# Patient Record
Sex: MALE | Race: OTHER | HISPANIC OR LATINO
[De-identification: names, ages, dates, MRNs, and addresses within clinical notes are randomized per-mention and may not be internally consistent; named-entity substitution may affect disease eponyms.]

---

## 2017-01-26 ENCOUNTER — APPOINTMENT (OUTPATIENT)
Dept: ORTHOPEDIC SURGERY | Facility: CLINIC | Age: 37
End: 2017-01-26

## 2017-04-03 ENCOUNTER — APPOINTMENT (OUTPATIENT)
Dept: ORTHOPEDIC SURGERY | Facility: CLINIC | Age: 37
End: 2017-04-03

## 2017-04-27 ENCOUNTER — APPOINTMENT (OUTPATIENT)
Dept: ORTHOPEDIC SURGERY | Facility: CLINIC | Age: 37
End: 2017-04-27

## 2017-10-14 ENCOUNTER — INPATIENT (INPATIENT)
Facility: HOSPITAL | Age: 37
LOS: 2 days | Discharge: HOME | DRG: 379 | End: 2017-10-17
Attending: INTERNAL MEDICINE | Admitting: INTERNAL MEDICINE
Payer: COMMERCIAL

## 2017-10-14 VITALS
DIASTOLIC BLOOD PRESSURE: 74 MMHG | OXYGEN SATURATION: 98 % | RESPIRATION RATE: 18 BRPM | HEART RATE: 81 BPM | SYSTOLIC BLOOD PRESSURE: 106 MMHG | TEMPERATURE: 100 F

## 2017-10-14 DIAGNOSIS — M10.9 GOUT, UNSPECIFIED: ICD-10-CM

## 2017-10-14 DIAGNOSIS — K92.2 GASTROINTESTINAL HEMORRHAGE, UNSPECIFIED: ICD-10-CM

## 2017-10-14 DIAGNOSIS — G47.30 SLEEP APNEA, UNSPECIFIED: ICD-10-CM

## 2017-10-14 LAB
ALBUMIN SERPL ELPH-MCNC: 4.1 G/DL — SIGNIFICANT CHANGE UP (ref 3.3–5)
ALP SERPL-CCNC: 54 U/L — SIGNIFICANT CHANGE UP (ref 40–120)
ALT FLD-CCNC: 29 U/L RC — SIGNIFICANT CHANGE UP (ref 10–45)
ANION GAP SERPL CALC-SCNC: 10 MMOL/L — SIGNIFICANT CHANGE UP (ref 5–17)
APPEARANCE UR: CLEAR — SIGNIFICANT CHANGE UP
APTT BLD: 29.1 SEC — SIGNIFICANT CHANGE UP (ref 27.5–37.4)
AST SERPL-CCNC: 21 U/L — SIGNIFICANT CHANGE UP (ref 10–40)
BASOPHILS # BLD AUTO: 0 K/UL — SIGNIFICANT CHANGE UP (ref 0–0.2)
BASOPHILS NFR BLD AUTO: 0.3 % — SIGNIFICANT CHANGE UP (ref 0–2)
BILIRUB SERPL-MCNC: 0.4 MG/DL — SIGNIFICANT CHANGE UP (ref 0.2–1.2)
BILIRUB UR-MCNC: NEGATIVE — SIGNIFICANT CHANGE UP
BUN SERPL-MCNC: 19 MG/DL — SIGNIFICANT CHANGE UP (ref 7–23)
CALCIUM SERPL-MCNC: 9.4 MG/DL — SIGNIFICANT CHANGE UP (ref 8.4–10.5)
CHLORIDE SERPL-SCNC: 104 MMOL/L — SIGNIFICANT CHANGE UP (ref 96–108)
CO2 SERPL-SCNC: 25 MMOL/L — SIGNIFICANT CHANGE UP (ref 22–31)
COLOR SPEC: SIGNIFICANT CHANGE UP
CREAT SERPL-MCNC: 1.21 MG/DL — SIGNIFICANT CHANGE UP (ref 0.5–1.3)
DIFF PNL FLD: NEGATIVE — SIGNIFICANT CHANGE UP
EOSINOPHIL # BLD AUTO: 0.1 K/UL — SIGNIFICANT CHANGE UP (ref 0–0.5)
EOSINOPHIL NFR BLD AUTO: 1.2 % — SIGNIFICANT CHANGE UP (ref 0–6)
GLUCOSE SERPL-MCNC: 85 MG/DL — SIGNIFICANT CHANGE UP (ref 70–99)
GLUCOSE UR QL: NEGATIVE — SIGNIFICANT CHANGE UP
HCT VFR BLD CALC: 39.7 % — SIGNIFICANT CHANGE UP (ref 39–50)
HCT VFR BLD CALC: 45 % — SIGNIFICANT CHANGE UP (ref 39–50)
HGB BLD-MCNC: 13.8 G/DL — SIGNIFICANT CHANGE UP (ref 13–17)
HGB BLD-MCNC: 15.3 G/DL — SIGNIFICANT CHANGE UP (ref 13–17)
INR BLD: 1.06 RATIO — SIGNIFICANT CHANGE UP (ref 0.88–1.16)
KETONES UR-MCNC: NEGATIVE — SIGNIFICANT CHANGE UP
LEUKOCYTE ESTERASE UR-ACNC: NEGATIVE — SIGNIFICANT CHANGE UP
LIDOCAIN IGE QN: 30 U/L — SIGNIFICANT CHANGE UP (ref 7–60)
LYMPHOCYTES # BLD AUTO: 1.6 K/UL — SIGNIFICANT CHANGE UP (ref 1–3.3)
LYMPHOCYTES # BLD AUTO: 23 % — SIGNIFICANT CHANGE UP (ref 13–44)
MCHC RBC-ENTMCNC: 32.6 PG — SIGNIFICANT CHANGE UP (ref 27–34)
MCHC RBC-ENTMCNC: 33.1 PG — SIGNIFICANT CHANGE UP (ref 27–34)
MCHC RBC-ENTMCNC: 34.1 GM/DL — SIGNIFICANT CHANGE UP (ref 32–36)
MCHC RBC-ENTMCNC: 34.8 GM/DL — SIGNIFICANT CHANGE UP (ref 32–36)
MCV RBC AUTO: 95 FL — SIGNIFICANT CHANGE UP (ref 80–100)
MCV RBC AUTO: 95.5 FL — SIGNIFICANT CHANGE UP (ref 80–100)
MONOCYTES # BLD AUTO: 0.8 K/UL — SIGNIFICANT CHANGE UP (ref 0–0.9)
MONOCYTES NFR BLD AUTO: 11.5 % — SIGNIFICANT CHANGE UP (ref 2–14)
NEUTROPHILS # BLD AUTO: 4.5 K/UL — SIGNIFICANT CHANGE UP (ref 1.8–7.4)
NEUTROPHILS NFR BLD AUTO: 64 % — SIGNIFICANT CHANGE UP (ref 43–77)
NITRITE UR-MCNC: NEGATIVE — SIGNIFICANT CHANGE UP
PH UR: 6 — SIGNIFICANT CHANGE UP (ref 5–8)
PLATELET # BLD AUTO: 194 K/UL — SIGNIFICANT CHANGE UP (ref 150–400)
PLATELET # BLD AUTO: 233 K/UL — SIGNIFICANT CHANGE UP (ref 150–400)
POTASSIUM SERPL-MCNC: 4.5 MMOL/L — SIGNIFICANT CHANGE UP (ref 3.5–5.3)
POTASSIUM SERPL-SCNC: 4.5 MMOL/L — SIGNIFICANT CHANGE UP (ref 3.5–5.3)
PROT SERPL-MCNC: 7.4 G/DL — SIGNIFICANT CHANGE UP (ref 6–8.3)
PROT UR-MCNC: NEGATIVE — SIGNIFICANT CHANGE UP
PROTHROM AB SERPL-ACNC: 11.6 SEC — SIGNIFICANT CHANGE UP (ref 9.8–12.7)
RBC # BLD: 4.18 M/UL — LOW (ref 4.2–5.8)
RBC # BLD: 4.71 M/UL — SIGNIFICANT CHANGE UP (ref 4.2–5.8)
RBC # FLD: 11.6 % — SIGNIFICANT CHANGE UP (ref 10.3–14.5)
RBC # FLD: 11.6 % — SIGNIFICANT CHANGE UP (ref 10.3–14.5)
SODIUM SERPL-SCNC: 139 MMOL/L — SIGNIFICANT CHANGE UP (ref 135–145)
SP GR SPEC: >1.03 — HIGH (ref 1.01–1.02)
UROBILINOGEN FLD QL: NEGATIVE — SIGNIFICANT CHANGE UP
WBC # BLD: 6.4 K/UL — SIGNIFICANT CHANGE UP (ref 3.8–10.5)
WBC # BLD: 7.1 K/UL — SIGNIFICANT CHANGE UP (ref 3.8–10.5)
WBC # FLD AUTO: 6.4 K/UL — SIGNIFICANT CHANGE UP (ref 3.8–10.5)
WBC # FLD AUTO: 7.1 K/UL — SIGNIFICANT CHANGE UP (ref 3.8–10.5)

## 2017-10-14 PROCEDURE — 93010 ELECTROCARDIOGRAM REPORT: CPT

## 2017-10-14 PROCEDURE — 71010: CPT | Mod: 26

## 2017-10-14 PROCEDURE — 74177 CT ABD & PELVIS W/CONTRAST: CPT | Mod: 26

## 2017-10-14 PROCEDURE — 99285 EMERGENCY DEPT VISIT HI MDM: CPT | Mod: 25

## 2017-10-14 RX ORDER — FAMOTIDINE 10 MG/ML
20 INJECTION INTRAVENOUS ONCE
Qty: 0 | Refills: 0 | Status: COMPLETED | OUTPATIENT
Start: 2017-10-14 | End: 2017-10-14

## 2017-10-14 RX ORDER — PANTOPRAZOLE SODIUM 20 MG/1
40 TABLET, DELAYED RELEASE ORAL EVERY 12 HOURS
Qty: 0 | Refills: 0 | Status: DISCONTINUED | OUTPATIENT
Start: 2017-10-14 | End: 2017-10-16

## 2017-10-14 RX ORDER — ONDANSETRON 8 MG/1
4 TABLET, FILM COATED ORAL ONCE
Qty: 0 | Refills: 0 | Status: COMPLETED | OUTPATIENT
Start: 2017-10-14 | End: 2017-10-14

## 2017-10-14 RX ORDER — MODAFINIL 200 MG/1
1 TABLET ORAL
Qty: 0 | Refills: 0 | COMMUNITY

## 2017-10-14 RX ORDER — ACETAMINOPHEN 500 MG
1000 TABLET ORAL ONCE
Qty: 0 | Refills: 0 | Status: COMPLETED | OUTPATIENT
Start: 2017-10-14 | End: 2017-10-14

## 2017-10-14 RX ORDER — SODIUM CHLORIDE 9 MG/ML
3 INJECTION INTRAMUSCULAR; INTRAVENOUS; SUBCUTANEOUS ONCE
Qty: 0 | Refills: 0 | Status: COMPLETED | OUTPATIENT
Start: 2017-10-14 | End: 2017-10-14

## 2017-10-14 RX ORDER — MODAFINIL 200 MG/1
200 TABLET ORAL DAILY
Qty: 0 | Refills: 0 | Status: DISCONTINUED | OUTPATIENT
Start: 2017-10-14 | End: 2017-10-17

## 2017-10-14 RX ORDER — PANTOPRAZOLE SODIUM 20 MG/1
40 TABLET, DELAYED RELEASE ORAL ONCE
Qty: 0 | Refills: 0 | Status: COMPLETED | OUTPATIENT
Start: 2017-10-14 | End: 2017-10-14

## 2017-10-14 RX ORDER — SODIUM CHLORIDE 9 MG/ML
1000 INJECTION, SOLUTION INTRAVENOUS
Qty: 0 | Refills: 0 | Status: DISCONTINUED | OUTPATIENT
Start: 2017-10-14 | End: 2017-10-17

## 2017-10-14 RX ORDER — OMEGA-3 ACID ETHYL ESTERS 1 G
1 CAPSULE ORAL
Qty: 0 | Refills: 0 | COMMUNITY

## 2017-10-14 RX ORDER — INFLUENZA VIRUS VACCINE 15; 15; 15; 15 UG/.5ML; UG/.5ML; UG/.5ML; UG/.5ML
0.5 SUSPENSION INTRAMUSCULAR ONCE
Qty: 0 | Refills: 0 | Status: COMPLETED | OUTPATIENT
Start: 2017-10-14 | End: 2017-10-14

## 2017-10-14 RX ORDER — SODIUM CHLORIDE 9 MG/ML
1000 INJECTION INTRAMUSCULAR; INTRAVENOUS; SUBCUTANEOUS ONCE
Qty: 0 | Refills: 0 | Status: COMPLETED | OUTPATIENT
Start: 2017-10-14 | End: 2017-10-14

## 2017-10-14 RX ORDER — GLYCERIN, PHENYLEPHRINE HCL, PRAMOXINE HCL, WHITE PETROLATUM 14.4; .25; 1; 15 G/100G; G/100G; G/100G; G/100G
0 CREAM TOPICAL
Qty: 0 | Refills: 0 | COMMUNITY

## 2017-10-14 RX ADMIN — PANTOPRAZOLE SODIUM 40 MILLIGRAM(S): 20 TABLET, DELAYED RELEASE ORAL at 15:54

## 2017-10-14 RX ADMIN — SODIUM CHLORIDE 3 MILLILITER(S): 9 INJECTION INTRAMUSCULAR; INTRAVENOUS; SUBCUTANEOUS at 15:54

## 2017-10-14 RX ADMIN — FAMOTIDINE 20 MILLIGRAM(S): 10 INJECTION INTRAVENOUS at 15:53

## 2017-10-14 RX ADMIN — ONDANSETRON 4 MILLIGRAM(S): 8 TABLET, FILM COATED ORAL at 15:53

## 2017-10-14 RX ADMIN — Medication 400 MILLIGRAM(S): at 21:00

## 2017-10-14 RX ADMIN — SODIUM CHLORIDE 1000 MILLILITER(S): 9 INJECTION INTRAMUSCULAR; INTRAVENOUS; SUBCUTANEOUS at 15:53

## 2017-10-14 NOTE — ED PROVIDER NOTE - OBJECTIVE STATEMENT
Private Physician Mckenzie Freeman, pcp,shawn Manzo ([id number]  37y male pmh rectal bleeding with dark tarry stool with  clots, past two weeks, Last night symptoms worsened. Pt complains of fatigued, weak, Nasuea without vomiting. Last colonscopy 7y ago ? fissure. No chest pain,sob,palps.hemetemeis. Private Physician Mckenzie Freeman, pcp,   Modesto Manzo (GI) not seen recently.  37y male pmh rectal bleeding with dark tarry stool with  clots, past two weeks, Last night symptoms worsened. Pt complains of fatigued, weak, Nasuea without vomiting. Last colonscopy 7y ago ? fissure. No chest pain,sob,palps.hemetemeis.

## 2017-10-14 NOTE — H&P ADULT - HISTORY OF PRESENT ILLNESS
·37y male pmh rectal bleeding with dark tarry stool with clots, past two weeks, Last night symptoms worsened. Pt complains of fatigued, weak, Nausea without vomiting. Last coloscopy 7y ago ? fissure. No chest pain,sob, fever, chills,  palpitations. hematemesis, no recent weight loss, loss of appetite pt says that bleeding per rectum was bloody few day ago and since yesterday it was DARK BLACK colored . denies using nsaids

## 2017-10-14 NOTE — ED PROVIDER NOTE - MEDICAL DECISION MAKING DETAILS
PT with prior gi bleed pw bleeding with clots/melena, Check labs, ct abd ekg, probable admit.  Jason Henry MD, Facep

## 2017-10-14 NOTE — ED ADULT NURSE NOTE - OBJECTIVE STATEMENT
36 y/o male hx HTN, Vertigo, sleep apnea, presents to the ED c/o black tarry stools x 2 weeks. As per pt, had past hx of similar episode, dx with internal hemorrhoids. Pt endorsing, LLQ abdominal pain, multiple episodes of black tarry stool diarrhea +clots, chills, nausea. Denies fever, vomiting, urinary s/s. Pt A&Ox3, in no respiratory distress, no CP, abdomen soft, tender to palpation in LLQ, nondistended, +BS, resting comfortably with safety maintained and family at bedside.

## 2017-10-14 NOTE — ED ADULT NURSE REASSESSMENT NOTE - NS ED NURSE REASSESS COMMENT FT1
Pt resting comfortably states improvement of pain, awaiting CT results and bed. Resting comfortably with safety maintained.

## 2017-10-15 LAB
ANION GAP SERPL CALC-SCNC: 13 MMOL/L — SIGNIFICANT CHANGE UP (ref 5–17)
BUN SERPL-MCNC: 14 MG/DL — SIGNIFICANT CHANGE UP (ref 7–23)
CALCIUM SERPL-MCNC: 8.8 MG/DL — SIGNIFICANT CHANGE UP (ref 8.4–10.5)
CHLORIDE SERPL-SCNC: 106 MMOL/L — SIGNIFICANT CHANGE UP (ref 96–108)
CO2 SERPL-SCNC: 22 MMOL/L — SIGNIFICANT CHANGE UP (ref 22–31)
CREAT SERPL-MCNC: 1.2 MG/DL — SIGNIFICANT CHANGE UP (ref 0.5–1.3)
GLUCOSE SERPL-MCNC: 90 MG/DL — SIGNIFICANT CHANGE UP (ref 70–99)
HCT VFR BLD CALC: 42 % — SIGNIFICANT CHANGE UP (ref 39–50)
HGB BLD-MCNC: 13.6 G/DL — SIGNIFICANT CHANGE UP (ref 13–17)
MCHC RBC-ENTMCNC: 30 PG — SIGNIFICANT CHANGE UP (ref 27–34)
MCHC RBC-ENTMCNC: 32.4 GM/DL — SIGNIFICANT CHANGE UP (ref 32–36)
MCV RBC AUTO: 92.7 FL — SIGNIFICANT CHANGE UP (ref 80–100)
PLATELET # BLD AUTO: 223 K/UL — SIGNIFICANT CHANGE UP (ref 150–400)
POTASSIUM SERPL-MCNC: 4.5 MMOL/L — SIGNIFICANT CHANGE UP (ref 3.5–5.3)
POTASSIUM SERPL-SCNC: 4.5 MMOL/L — SIGNIFICANT CHANGE UP (ref 3.5–5.3)
RBC # BLD: 4.53 M/UL — SIGNIFICANT CHANGE UP (ref 4.2–5.8)
RBC # FLD: 12.9 % — SIGNIFICANT CHANGE UP (ref 10.3–14.5)
SODIUM SERPL-SCNC: 141 MMOL/L — SIGNIFICANT CHANGE UP (ref 135–145)
WBC # BLD: 5.47 K/UL — SIGNIFICANT CHANGE UP (ref 3.8–10.5)
WBC # FLD AUTO: 5.47 K/UL — SIGNIFICANT CHANGE UP (ref 3.8–10.5)

## 2017-10-15 RX ORDER — SOD SULF/SODIUM/NAHCO3/KCL/PEG
1 SOLUTION, RECONSTITUTED, ORAL ORAL
Qty: 0 | Refills: 0 | Status: DISCONTINUED | OUTPATIENT
Start: 2017-10-15 | End: 2017-10-17

## 2017-10-15 RX ORDER — MULTIVIT WITH MIN/MFOLATE/K2 340-15/3 G
295 POWDER (GRAM) ORAL ONCE
Qty: 0 | Refills: 0 | Status: COMPLETED | OUTPATIENT
Start: 2017-10-15 | End: 2017-10-15

## 2017-10-15 RX ORDER — ACETAMINOPHEN 500 MG
650 TABLET ORAL ONCE
Qty: 0 | Refills: 0 | Status: COMPLETED | OUTPATIENT
Start: 2017-10-15 | End: 2017-10-15

## 2017-10-15 RX ADMIN — PANTOPRAZOLE SODIUM 40 MILLIGRAM(S): 20 TABLET, DELAYED RELEASE ORAL at 17:33

## 2017-10-15 RX ADMIN — MODAFINIL 200 MILLIGRAM(S): 200 TABLET ORAL at 11:24

## 2017-10-15 RX ADMIN — Medication 650 MILLIGRAM(S): at 22:24

## 2017-10-15 RX ADMIN — PANTOPRAZOLE SODIUM 40 MILLIGRAM(S): 20 TABLET, DELAYED RELEASE ORAL at 06:26

## 2017-10-15 RX ADMIN — Medication 1 LITER(S): at 18:28

## 2017-10-15 RX ADMIN — Medication 295 MILLILITER(S): at 13:41

## 2017-10-15 RX ADMIN — Medication 650 MILLIGRAM(S): at 04:08

## 2017-10-15 RX ADMIN — SODIUM CHLORIDE 75 MILLILITER(S): 9 INJECTION, SOLUTION INTRAVENOUS at 11:13

## 2017-10-15 RX ADMIN — Medication 650 MILLIGRAM(S): at 21:54

## 2017-10-15 RX ADMIN — Medication 650 MILLIGRAM(S): at 04:38

## 2017-10-15 NOTE — PROGRESS NOTE ADULT - SUBJECTIVE AND OBJECTIVE BOX
~Patient doing well  No significant bleeding  AVSS  Abd benign  H/H stable  Plan for scope by GI on Monday  Will Follow

## 2017-10-15 NOTE — PROGRESS NOTE ADULT - SUBJECTIVE AND OBJECTIVE BOX
chief complaint : bleeding per rectum         SUBJECTIVE / OVERNIGHT EVENTS: pt denies abd pain, nausea, vomiting       MEDICATIONS  (STANDING):  influenza   Vaccine 0.5 milliLiter(s) IntraMuscular once  modafinil 200 milliGRAM(s) Oral daily  pantoprazole  Injectable 40 milliGRAM(s) IV Push every 12 hours  polyethylene glycol/electrolyte Solution 1 Liter(s) Oral two times a day  sodium chloride 0.45%. 1000 milliLiter(s) (75 mL/Hr) IV Continuous <Continuous>    MEDICATIONS  (PRN):        CAPILLARY BLOOD GLUCOSE        I&O's Summary    14 Oct 2017 07:01  -  15 Oct 2017 07:00  --------------------------------------------------------  IN: 200 mL / OUT: 0 mL / NET: 200 mL    15 Oct 2017 07:01  -  15 Oct 2017 22:51  --------------------------------------------------------  IN: 1100 mL / OUT: 0 mL / NET: 1100 mL        Constitutional: No fever, fatigue  Skin: No rash.  Eyes: No recent vision problems or eye pain.  ENT: No congestion, ear pain, or sore throat.  Cardiovascular: No chest pain or palpation.  Respiratory: No cough, shortness of breath, congestion, or wheezing.  Gastrointestinal: No abdominal pain, nausea, vomiting, or diarrhea.  Genitourinary: No dysuria.  Musculoskeletal: No joint swelling.  Neurologic: No headache.    PHYSICAL EXAM:  GENERAL: NAD  EYES: EOMI, PERRLA  NECK: Supple, No JVD  CHEST/LUNG: dec breath sounds at bases   HEART:  S1 , S2 +  ABDOMEN: obese soft, bs+  EXTREMITIES:  no edema  NEUROLOGY: alert awake calm cooperative       LABS:                        13.6   5.47  )-----------( 223      ( 15 Oct 2017 09:07 )             42.0     10-15    141  |  106  |  14  ----------------------------<  90  4.5   |  22  |  1.20    Ca    8.8      15 Oct 2017 09:30    TPro  7.4  /  Alb  4.1  /  TBili  0.4  /  DBili  x   /  AST  21  /  ALT  29  /  AlkPhos  54  10-14    PT/INR - ( 14 Oct 2017 16:21 )   PT: 11.6 sec;   INR: 1.06 ratio         PTT - ( 14 Oct 2017 16:21 )  PTT:29.1 sec      Urinalysis Basic - ( 14 Oct 2017 18:50 )    Color: PL Yellow / Appearance: Clear / SG: >1.030 / pH: x  Gluc: x / Ketone: Negative  / Bili: Negative / Urobili: Negative   Blood: x / Protein: Negative / Nitrite: Negative   Leuk Esterase: Negative / RBC: x / WBC x   Sq Epi: x / Non Sq Epi: x / Bacteria: x        RADIOLOGY & ADDITIONAL TESTS:    Imaging Personally Reviewed:    Consultant(s) Notes Reviewed:      Care Discussed with Consultants/Other Providers:

## 2017-10-15 NOTE — CONSULT NOTE ADULT - SUBJECTIVE AND OBJECTIVE BOX
Patient is a 37y Male     Patient is a 37y old  Male who presents with a chief complaint of GI bleed (14 Oct 2017 23:36)      HPI:  ·37y male pmh rectal bleeding with dark tarry stool with clots, past two weeks, Last night symptoms worsened. Pt complains of fatigued, weak, Nausea without vomiting. Last coloscopy 7y ago ? fissure. No chest pain,sob, fever, chills,  palpitations. hematemesis, no recent weight loss, loss of appetite pt says that bleeding per rectum was bloody few day ago and since yesterday it was DARK BLACK colored . denies using nsaids (14 Oct 2017 18:50) He has been taking supplements containing iron, denied pepto and uses NSAIDs occasionally. Had colonoscopy 8 yrs ago for bleeding and found to have anal fissure. Has difficulty with constipation and dx with IBS in the past.  He has gained a lot of weight recently.       PAST MEDICAL & SURGICAL HISTORY:  Vertigo  Hypertension  Gout  Sleep apnea-severe per pt.   Migraine  No significant past surgical history  No significant past surgical history      MEDICATIONS  (STANDING):  influenza   Vaccine 0.5 milliLiter(s) IntraMuscular once  modafinil 200 milliGRAM(s) Oral daily  pantoprazole  Injectable 40 milliGRAM(s) IV Push every 12 hours  sodium chloride 0.45%. 1000 milliLiter(s) (75 mL/Hr) IV Continuous <Continuous>      Allergies    No Known Allergies    Intolerances        SOCIAL HISTORY:  Denies ETOh,Smoking,     FAMILY HISTORY:      REVIEW OF SYSTEMS:    CONSTITUTIONAL: No weakness, fevers or chills  EYES/ENT: No visual changes;  No vertigo or throat pain   NECK: No pain or stiffness  RESPIRATORY: No cough, wheezing, hemoptysis; No shortness of breath  CARDIOVASCULAR: No chest pain or palpitations  GASTROINTESTINAL: No abdominal or epigastric pain. No nausea, vomiting, or hematemesis; No diarrhea or constipation. No melena or hematochezia.  GENITOURINARY: No dysuria, frequency or hematuria  NEUROLOGICAL: No numbness or weakness  SKIN: No itching, burning, rashes, or lesions   All other review of systems is negative unless indicated above.    VITAL:  T(C): , Max: 37.6 (10-14-17 @ 14:12)  T(F): , Max: 99.6 (10-14-17 @ 14:12)  HR: 63 (10-15-17 @ 03:51)  BP: 112/74 (10-15-17 @ 03:51)  BP(mean): --  RR: 17 (10-15-17 @ 03:51)  SpO2: 99% (10-15-17 @ 03:51)  Wt(kg): --    I and O's:    10-14 @ 07:01  -  10-15 @ 07:00  --------------------------------------------------------  IN: 200 mL / OUT: 0 mL / NET: 200 mL      Height (cm): 175.26 (10-14 @ 22:45)  Weight (kg): 128.4 (10-14 @ 22:45)  BMI (kg/m2): 41.8 (10-14 @ 22:45)  BSA (m2): 2.39 (10-14 @ 22:45)    PHYSICAL EXAM:    Constitutional: NAD  HEENT: PERRLA,   Neck: No JVD  Respiratory: CTA B/L  Cardiovascular: S1 and S2  Gastrointestinal: BS+, soft, NT/ND  Extremities: No peripheral edema  Neurological: A/O x 3, no focal deficits  Psychiatric: Normal mood, normal affect  : No Pina  Skin: No rashes  Access: Not applicable  Back: No CVA tenderness    LABS:                        13.6   5.47  )-----------( 223      ( 15 Oct 2017 09:07 )             42.0     10-15    141  |  106  |  14  ----------------------------<  90  4.5   |  22  |  1.20    Ca    8.8      15 Oct 2017 09:30    TPro  7.4  /  Alb  4.1  /  TBili  0.4  /  DBili  x   /  AST  21  /  ALT  29  /  AlkPhos  54  10-14          RADIOLOGY & ADDITIONAL STUDIES:

## 2017-10-15 NOTE — CONSULT NOTE ADULT - ASSESSMENT
History suggests significant GI bleeding over the past 2 weeks but Hb normal. Iron supplements contributing to dark stools. Recommended EGD and colonoscopy as inpt due to his severe LYNN. R/B/A discussed. Will prep today for procedures tomorrow.

## 2017-10-16 ENCOUNTER — TRANSCRIPTION ENCOUNTER (OUTPATIENT)
Age: 37
End: 2017-10-16

## 2017-10-16 ENCOUNTER — RESULT REVIEW (OUTPATIENT)
Age: 37
End: 2017-10-16

## 2017-10-16 LAB
HCT VFR BLD CALC: 41 % — SIGNIFICANT CHANGE UP (ref 39–50)
HGB BLD-MCNC: 13.7 G/DL — SIGNIFICANT CHANGE UP (ref 13–17)
MCHC RBC-ENTMCNC: 30.8 PG — SIGNIFICANT CHANGE UP (ref 27–34)
MCHC RBC-ENTMCNC: 33.4 GM/DL — SIGNIFICANT CHANGE UP (ref 32–36)
MCV RBC AUTO: 92.1 FL — SIGNIFICANT CHANGE UP (ref 80–100)
PLATELET # BLD AUTO: 228 K/UL — SIGNIFICANT CHANGE UP (ref 150–400)
RBC # BLD: 4.45 M/UL — SIGNIFICANT CHANGE UP (ref 4.2–5.8)
RBC # FLD: 13 % — SIGNIFICANT CHANGE UP (ref 10.3–14.5)
WBC # BLD: 6.36 K/UL — SIGNIFICANT CHANGE UP (ref 3.8–10.5)
WBC # FLD AUTO: 6.36 K/UL — SIGNIFICANT CHANGE UP (ref 3.8–10.5)

## 2017-10-16 PROCEDURE — 88312 SPECIAL STAINS GROUP 1: CPT | Mod: 26

## 2017-10-16 PROCEDURE — 88305 TISSUE EXAM BY PATHOLOGIST: CPT | Mod: 26

## 2017-10-16 RX ORDER — PANTOPRAZOLE SODIUM 20 MG/1
40 TABLET, DELAYED RELEASE ORAL
Qty: 0 | Refills: 0 | Status: DISCONTINUED | OUTPATIENT
Start: 2017-10-16 | End: 2017-10-17

## 2017-10-16 RX ADMIN — MODAFINIL 200 MILLIGRAM(S): 200 TABLET ORAL at 09:00

## 2017-10-16 RX ADMIN — PANTOPRAZOLE SODIUM 40 MILLIGRAM(S): 20 TABLET, DELAYED RELEASE ORAL at 05:50

## 2017-10-16 RX ADMIN — Medication 1 LITER(S): at 05:49

## 2017-10-16 RX ADMIN — PANTOPRAZOLE SODIUM 40 MILLIGRAM(S): 20 TABLET, DELAYED RELEASE ORAL at 18:44

## 2017-10-16 NOTE — PROGRESS NOTE ADULT - SUBJECTIVE AND OBJECTIVE BOX
LEVI LOPEZ:[id number],   37yMale followed for: brbpr  No Known Allergies    PAST MEDICAL & SURGICAL HISTORY:  Vertigo  Hypertension  Gout  Sleep apnea  Migraine  No significant past surgical history  No significant past surgical history    FAMILY HISTORY:    MEDICATIONS  (STANDING):  influenza   Vaccine 0.5 milliLiter(s) IntraMuscular once  modafinil 200 milliGRAM(s) Oral daily  pantoprazole    Tablet 40 milliGRAM(s) Oral two times a day before meals  polyethylene glycol/electrolyte Solution 1 Liter(s) Oral two times a day  sodium chloride 0.45%. 1000 milliLiter(s) (75 mL/Hr) IV Continuous <Continuous>    MEDICATIONS  (PRN):      Vital Signs Last 24 Hrs  T(C): 36.9 (16 Oct 2017 12:23), Max: 36.9 (16 Oct 2017 12:23)  T(F): 98.4 (16 Oct 2017 12:23), Max: 98.4 (16 Oct 2017 12:23)  HR: 59 (16 Oct 2017 12:23) (59 - 72)  BP: 109/63 (16 Oct 2017 12:23) (109/63 - 124/84)  BP(mean): --  RR: 18 (16 Oct 2017 12:23) (18 - 18)  SpO2: 97% (16 Oct 2017 12:23) (97% - 98%)  nc/at  s1s2  cta  soft, nt, nd no guarding or rebound  no c/c/e    CBC Full  -  ( 16 Oct 2017 07:22 )  WBC Count : 6.36 K/uL  Hemoglobin : 13.7 g/dL  Hematocrit : 41.0 %  Platelet Count - Automated : 228 K/uL  Mean Cell Volume : 92.1 fl  Mean Cell Hemoglobin : 30.8 pg  Mean Cell Hemoglobin Concentration : 33.4 gm/dL  Auto Neutrophil # : x  Auto Lymphocyte # : x  Auto Monocyte # : x  Auto Eosinophil # : x  Auto Basophil # : x  Auto Neutrophil % : x  Auto Lymphocyte % : x  Auto Monocyte % : x  Auto Eosinophil % : x  Auto Basophil % : x    10-15    141  |  106  |  14  ----------------------------<  90  4.5   |  22  |  1.20    Ca    8.8      15 Oct 2017 09:30    TPro  7.4  /  Alb  4.1  /  TBili  0.4  /  DBili  x   /  AST  21  /  ALT  29  /  AlkPhos  54  10-14    PT/INR - ( 14 Oct 2017 16:21 )   PT: 11.6 sec;   INR: 1.06 ratio         PTT - ( 14 Oct 2017 16:21 )  PTT:29.1 sec

## 2017-10-16 NOTE — DISCHARGE NOTE ADULT - MEDICATION SUMMARY - MEDICATIONS TO TAKE
I will START or STAY ON the medications listed below when I get home from the hospital:    Preparation H Cream  -- As needed  -- Indication: For hemorrhoids     Provigil 200 mg oral tablet  -- 1 tab(s) by mouth once a day  -- Indication: For juanpablo    Fish Oil oral capsule  -- 1 cap(s) by mouth once a day  -- Indication: For Supplement    pantoprazole 40 mg oral delayed release tablet  -- 1 tab(s) by mouth 2 times a day (before meals)  -- Indication: For Gastrointestinal hemorrhage    Multiple Vitamins oral tablet  -- 1 tab(s) by mouth once a day  -- Indication: For Supplement

## 2017-10-16 NOTE — DISCHARGE NOTE ADULT - HOSPITAL COURSE
37y male pmh rectal bleeding with dark tarry stool with clots, past two weeks, Last night symptoms worsened. Pt complains of fatigued, weak, Nausea without vomiting. Last coloscopy 7y ago ? fissure. No chest pain,sob, fever, chills,  palpitations. Hematemesis, no recent weight loss, loss of appetite pt says that bleeding per rectum was bloody few day ago and since yesterday it was DARK BLACK colored . Denies using nsaids   - Normal esophagus.                       - Normal stomach. mild gastritis, bx                       - Normal examined duodenum.                       - No specimens collected.   A few small-mouthed diverticula were found in the sigmoid colon.  Pt to follow up with Dr. Shaw as outpatient for capsule study.

## 2017-10-16 NOTE — DISCHARGE NOTE ADULT - PLAN OF CARE
resolved There are 2 common types of GI Bleed, Upper GI Bleed and Lower GI Bleed.  Upper GI Bleed affects the esophagus, stomach, and first part of the small intestine. Lower GI Bleed affects the colon and rectum.  Upper GI Bleed signs and symptoms to notify your Health Care Provider are vomiting blood, or coffee ground vomitus, and bowel movements that look like black tar.  Lower GI Bleed signs and symptoms to notify your health care provider are bright red bloody bowel movements.   Take your medications as prescribed by your Gastroenterologist.  If you have had an Endoscopy or Colonoscopy, follow up with your Gastroenterologist for Pathology results.  Avoid NSAIDs unless your Health Care Provider tells you that it is ok (Aspirin, Ibuprofen, Advil, Motrin, Aleve).  Follow up with your Gastroenterologist within 1-2 weeks of discharge. follow up with PMD Follow up with your medical doctor to establish long term blood pressure treatment goals. continue provigil  follow up with PMD

## 2017-10-16 NOTE — DISCHARGE NOTE ADULT - CARE PLAN
Principal Discharge DX:	GI bleed  Goal:	resolved  Instructions for follow-up, activity and diet:	There are 2 common types of GI Bleed, Upper GI Bleed and Lower GI Bleed.  Upper GI Bleed affects the esophagus, stomach, and first part of the small intestine. Lower GI Bleed affects the colon and rectum.  Upper GI Bleed signs and symptoms to notify your Health Care Provider are vomiting blood, or coffee ground vomitus, and bowel movements that look like black tar.  Lower GI Bleed signs and symptoms to notify your health care provider are bright red bloody bowel movements.   Take your medications as prescribed by your Gastroenterologist.  If you have had an Endoscopy or Colonoscopy, follow up with your Gastroenterologist for Pathology results.  Avoid NSAIDs unless your Health Care Provider tells you that it is ok (Aspirin, Ibuprofen, Advil, Motrin, Aleve).  Follow up with your Gastroenterologist within 1-2 weeks of discharge.  Secondary Diagnosis:	Gout  Instructions for follow-up, activity and diet:	follow up with PMD  Secondary Diagnosis:	Hypertension  Instructions for follow-up, activity and diet:	Follow up with your medical doctor to establish long term blood pressure treatment goals.  Secondary Diagnosis:	Sleep apnea  Instructions for follow-up, activity and diet:	continue provigil  follow up with PMD

## 2017-10-16 NOTE — DISCHARGE NOTE ADULT - PATIENT PORTAL LINK FT
“You can access the [employer] Patient Portal, offered by [facility], by registering with the following website: [email or web address]”

## 2017-10-16 NOTE — PROGRESS NOTE ADULT - SUBJECTIVE AND OBJECTIVE BOX
chief complaint : bleeding per rectum         SUBJECTIVE / OVERNIGHT EVENTS: pt denies abd pain, nausea, vomiting     MEDICATIONS  (STANDING):  influenza   Vaccine 0.5 milliLiter(s) IntraMuscular once  modafinil 200 milliGRAM(s) Oral daily  pantoprazole    Tablet 40 milliGRAM(s) Oral two times a day before meals  polyethylene glycol/electrolyte Solution 1 Liter(s) Oral two times a day  sodium chloride 0.45%. 1000 milliLiter(s) (75 mL/Hr) IV Continuous <Continuous>    MEDICATIONS  (PRN):      Vital Signs Last 24 Hrs  T(C): 36.8 (16 Oct 2017 04:02), Max: 36.8 (15 Oct 2017 12:43)  T(F): 98.3 (16 Oct 2017 04:02), Max: 98.3 (16 Oct 2017 04:02)  HR: 72 (16 Oct 2017 04:02) (68 - 72)  BP: 124/84 (16 Oct 2017 04:02) (101/75 - 124/84)  BP(mean): --  RR: 18 (16 Oct 2017 04:02) (18 - 18)  SpO2: 97% (16 Oct 2017 04:02) (97% - 98%)    Constitutional: No fever, fatigue  Skin: No rash.  Eyes: No recent vision problems or eye pain.  ENT: No congestion, ear pain, or sore throat.  Cardiovascular: No chest pain or palpation.  Respiratory: No cough, shortness of breath, congestion, or wheezing.  Gastrointestinal: No abdominal pain, nausea, vomiting, or diarrhea.  Genitourinary: No dysuria.  Musculoskeletal: No joint swelling.  Neurologic: No headache.    PHYSICAL EXAM:  GENERAL: NAD  EYES: EOMI, PERRLA  NECK: Supple, No JVD  CHEST/LUNG: dec breath sounds at bases   HEART:  S1 , S2 +  ABDOMEN: obese soft, bs+  EXTREMITIES:  no edema  NEUROLOGY: alert awake calm cooperative     LABS:  10-15    141  |  106  |  14  ----------------------------<  90  4.5   |  22  |  1.20    Ca    8.8      15 Oct 2017 09:30    TPro  7.4  /  Alb  4.1  /  TBili  0.4  /  DBili      /  AST  21  /  ALT  29  /  AlkPhos  54  10-14    Creatinine Trend: 1.20 <--, 1.21 <--                        13.7   6.36  )-----------( 228      ( 16 Oct 2017 07:22 )             41.0     Urine Studies:  Urinalysis Basic - ( 14 Oct 2017 18:50 )    Color: PL Yellow / Appearance: Clear / SG: >1.030 / pH:   Gluc:  / Ketone: Negative  / Bili: Negative / Urobili: Negative   Blood:  / Protein: Negative / Nitrite: Negative   Leuk Esterase: Negative / RBC:  / WBC    Sq Epi:  / Non Sq Epi:  / Bacteria:               LIVER FUNCTIONS - ( 14 Oct 2017 16:21 )  Alb: 4.1 g/dL / Pro: 7.4 g/dL / ALK PHOS: 54 U/L / ALT: 29 U/L RC / AST: 21 U/L / GGT: x           PT/INR - ( 14 Oct 2017 16:21 )   PT: 11.6 sec;   INR: 1.06 ratio         PTT - ( 14 Oct 2017 16:21 )  PTT:29.1 sec

## 2017-10-16 NOTE — DISCHARGE NOTE ADULT - CARE PROVIDER_API CALL
Claude Shaw), Gastroenterology  [address]  [place] [address]  Phone: [phone]  Fax: [phone]

## 2017-10-16 NOTE — CONSULT NOTE ADULT - SUBJECTIVE AND OBJECTIVE BOX
Patient is a 37y Male     Patient is a 37y old  Male who presents with a chief complaint of GI bleed (14 Oct 2017 23:36)      HPI:  ·37y male pmh rectal bleeding with dark tarry stool with clots, past two weeks, Last night symptoms worsened. Pt complains of fatigued, weak, Nausea without vomiting. Last coloscopy 7y ago ? fissure. No chest pain,sob, fever, chills,  palpitations. hematemesis, no recent weight loss, loss of appetite pt says that bleeding per rectum was bloody few day ago and since yesterday it was DARK BLACK colored . denies using nsaids (14 Oct 2017 18:50) He has been taking supplements containing iron, denied pepto and uses NSAIDs occasionally. Had colonoscopy 8 yrs ago for bleeding and found to have anal fissure. Has difficulty with constipation and dx with IBS in the past.  He has gained a lot of weight recently.       PAST MEDICAL & SURGICAL HISTORY:  Vertigo  Hypertension  Gout  Sleep apnea-severe per pt.   Migraine  No significant past surgical history  No significant past surgical history      MEDICATIONS  (STANDING):  influenza   Vaccine 0.5 milliLiter(s) IntraMuscular once  modafinil 200 milliGRAM(s) Oral daily  pantoprazole  Injectable 40 milliGRAM(s) IV Push every 12 hours  sodium chloride 0.45%. 1000 milliLiter(s) (75 mL/Hr) IV Continuous <Continuous>      Allergies    No Known Allergies    Intolerances        SOCIAL HISTORY:  Denies ETOh,Smoking,     FAMILY HISTORY:      REVIEW OF SYSTEMS:    CONSTITUTIONAL: No weakness, fevers or chills  EYES/ENT: No visual changes;  No vertigo or throat pain   NECK: No pain or stiffness  RESPIRATORY: No cough, wheezing, hemoptysis; No shortness of breath  CARDIOVASCULAR: No chest pain or palpitations  GASTROINTESTINAL: No abdominal or epigastric pain. No nausea, vomiting, or hematemesis; No diarrhea or constipation. No melena or hematochezia.  GENITOURINARY: No dysuria, frequency or hematuria  NEUROLOGICAL: No numbness or weakness  SKIN: No itching, burning, rashes, or lesions   All other review of systems is negative unless indicated above.    VITAL:  T(C): , Max: 37.6 (10-14-17 @ 14:12)  T(F): , Max: 99.6 (10-14-17 @ 14:12)  HR: 63 (10-15-17 @ 03:51)  BP: 112/74 (10-15-17 @ 03:51)  BP(mean): --  RR: 17 (10-15-17 @ 03:51)  SpO2: 99% (10-15-17 @ 03:51)  Wt(kg): --    I and O's:    10-14 @ 07:01  -  10-15 @ 07:00  --------------------------------------------------------  IN: 200 mL / OUT: 0 mL / NET: 200 mL      Height (cm): 175.26 (10-14 @ 22:45)  Weight (kg): 128.4 (10-14 @ 22:45)  BMI (kg/m2): 41.8 (10-14 @ 22:45)  BSA (m2): 2.39 (10-14 @ 22:45)    PHYSICAL EXAM:    Constitutional: NAD  HEENT: PERRLA,   Neck: No JVD  Respiratory: CTA B/L  Cardiovascular: S1 and S2  Gastrointestinal: BS+, soft, NT/ND  Extremities: No peripheral edema  Neurological: A/O x 3, no focal deficits  Psychiatric: Normal mood, normal affect  : No Pina  Skin: No rashes  Access: Not applicable  Back: No CVA tenderness    LABS:                        13.6   5.47  )-----------( 223      ( 15 Oct 2017 09:07 )             42.0     10-15    141  |  106  |  14  ----------------------------<  90  4.5   |  22  |  1.20    Ca    8.8      15 Oct 2017 09:30    TPro  7.4  /  Alb  4.1  /  TBili  0.4  /  DBili  x   /  AST  21  /  ALT  29  /  AlkPhos  54  10-14          RADIOLOGY & ADDITIONAL STUDIES:                              Assessment and Recommendation:   · Assessment		  History suggests significant GI bleeding over the past 2 weeks but Hb normal. Iron supplements contributing to dark stools. Recommended EGD and colonoscopy as inpt due to his severe LYNN. R/B/A discussed. Will prep today for procedures tomorrow.       Electronic Signatures:  RY WERNER)  (Signed 15-Oct-2017 12:22)  	Authored: Consult Note, Referral/Consultation, Subjective and Objective, Assessment and Recommendation      Last Updated: 15-Oct-2017 12:22 by RY WERNER) Patient is a 37y Male     Patient is a 37y old  Male who presents with a chief complaint of GI bleed (14 Oct 2017 23:36)      HPI:  ·37y male pmh rectal bleeding with dark tarry stool with clots, past two weeks, Last night symptoms worsened. Pt complains of fatigued, weak, Nausea without vomiting. Last coloscopy 7y ago ? fissure. No chest pain,sob, fever, chills,  palpitations. hematemesis, no recent weight loss, loss of appetite pt says that bleeding per rectum was bloody few day ago and since yesterday it was DARK BLACK colored . denies using nsaids (14 Oct 2017 18:50) He has been taking supplements containing iron, denied pepto and uses NSAIDs occasionally. Had colonoscopy 8 yrs ago for bleeding and found to have anal fissure. Has difficulty with constipation and dx with IBS in the past.  He has gained a lot of weight recently.       PAST MEDICAL & SURGICAL HISTORY:  Vertigo  Hypertension  Gout  Sleep apnea-severe per pt.   Migraine  No significant past surgical history  No significant past surgical history      MEDICATIONS  (STANDING):  influenza   Vaccine 0.5 milliLiter(s) IntraMuscular once  modafinil 200 milliGRAM(s) Oral daily  pantoprazole  Injectable 40 milliGRAM(s) IV Push every 12 hours  sodium chloride 0.45%. 1000 milliLiter(s) (75 mL/Hr) IV Continuous <Continuous>      Allergies    No Known Allergies    Intolerances        SOCIAL HISTORY:  Denies ETOh,Smoking,     FAMILY HISTORY:      REVIEW OF SYSTEMS:    CONSTITUTIONAL: No weakness, fevers or chills  EYES/ENT: No visual changes;  No vertigo or throat pain   NECK: No pain or stiffness  RESPIRATORY: No cough, wheezing, hemoptysis; No shortness of breath  CARDIOVASCULAR: No chest pain or palpitations  GASTROINTESTINAL: No abdominal or epigastric pain. No nausea, vomiting, or hematemesis; No diarrhea or constipation. No melena or hematochezia.  GENITOURINARY: No dysuria, frequency or hematuria  NEUROLOGICAL: No numbness or weakness  SKIN: No itching, burning, rashes, or lesions   All other review of systems is negative unless indicated above.    VITAL:  T(C): , Max: 37.6 (10-14-17 @ 14:12)  T(F): , Max: 99.6 (10-14-17 @ 14:12)  HR: 63 (10-15-17 @ 03:51)  BP: 112/74 (10-15-17 @ 03:51)  BP(mean): --  RR: 17 (10-15-17 @ 03:51)  SpO2: 99% (10-15-17 @ 03:51)  Wt(kg): --    I and O's:    10-14 @ 07:01  -  10-15 @ 07:00  --------------------------------------------------------  IN: 200 mL / OUT: 0 mL / NET: 200 mL      Height (cm): 175.26 (10-14 @ 22:45)  Weight (kg): 128.4 (10-14 @ 22:45)  BMI (kg/m2): 41.8 (10-14 @ 22:45)  BSA (m2): 2.39 (10-14 @ 22:45)    PHYSICAL EXAM:    Constitutional: NAD  HEENT: PERRLA,   Neck: No JVD  Respiratory: CTA B/L  Cardiovascular: S1 and S2  Gastrointestinal: BS+, soft, NT/ND  Extremities: No peripheral edema  Neurological: A/O x 3, no focal deficits  Psychiatric: Normal mood, normal affect  : No Pina  Skin: No rashes  Access: Not applicable  Back: No CVA tenderness    LABS:                        13.6   5.47  )-----------( 223      ( 15 Oct 2017 09:07 )             42.0     10-15    141  |  106  |  14  ----------------------------<  90  4.5   |  22  |  1.20    Ca    8.8      15 Oct 2017 09:30    TPro  7.4  /  Alb  4.1  /  TBili  0.4  /  DBili  x   /  AST  21  /  ALT  29  /  AlkPhos  54  10-14          RADIOLOGY & ADDITIONAL STUDIES:                              Assessment and Recommendation:   · Assessment		  Pt stable w GI bleeding.  May be from internal hemorrhoids.  Pt scheduled for colonoscopy and EGD by GI  Will follow.  No acute surgical intervention required at present.

## 2017-10-16 NOTE — PROGRESS NOTE ADULT - SUBJECTIVE AND OBJECTIVE BOX
Pre-Endoscopy Evaluation      Referring Physician: Dr. TILA Edwards                             Procedure: Egd/Colonoscopy    Indication for Procedure: GIB    Pertinent History: 37 year old male pmh  below admitted with rectal bleeding,  dark tarry stool with clots, and fatigye    Sedation by Anesthesia [X]    PAST MEDICAL & SURGICAL HISTORY:  Vertigo  Hypertension  Gout  Sleep apnea  Migraine  No significant past surgical history  No significant past surgical history      PMH of Gastroparesis [ ]  Gastric Surgery [ ]  Gastric Outlet Obstruction [ ]    Allergies:    No Known Allergies    Intolerances:    Latex allergy: [ ] yes [X] no    Medications:MEDICATIONS  (STANDING):  influenza   Vaccine 0.5 milliLiter(s) IntraMuscular once  modafinil 200 milliGRAM(s) Oral daily  pantoprazole    Tablet 40 milliGRAM(s) Oral two times a day before meals  polyethylene glycol/electrolyte Solution 1 Liter(s) Oral two times a day  sodium chloride 0.45%. 1000 milliLiter(s) (75 mL/Hr) IV Continuous <Continuous>    MEDICATIONS  (PRN):      Smoking: [ ] yes  [x] no    AICD/PPM: [ ] yes   [x] no    Pertinent lab data:                        13.7   6.36  )-----------( 228      ( 16 Oct 2017 07:22 )             41.0     10-15    141  |  106  |  14  ----------------------------<  90  4.5   |  22  |  1.20    Ca    8.8      15 Oct 2017 09:30    TPro  7.4  /  Alb  4.1  /  TBili  0.4  /  DBili  x   /  AST  21  /  ALT  29  /  AlkPhos  54  10-14    PT/INR - ( 14 Oct 2017 16:21 )   PT: 11.6 sec;   INR: 1.06 ratio      PTT - ( 14 Oct 2017 16:21 )  PTT:29.1 sec        Physical Examination:    Daily   Vital Signs Last 24 Hrs  T(C): 36.9 (16 Oct 2017 12:23), Max: 36.9 (16 Oct 2017 12:23)  T(F): 98.4 (16 Oct 2017 12:23), Max: 98.4 (16 Oct 2017 12:23)  HR: 59 (16 Oct 2017 12:23) (59 - 72)  BP: 109/63 (16 Oct 2017 12:23) (109/63 - 124/84)  BP(mean): --  RR: 18 (16 Oct 2017 12:23) (18 - 18)  SpO2: 97% (16 Oct 2017 12:23) (97% - 98%)      Constitutional: NAD    HEENT: PERRLA, EOMI,       Neck:  No JVD    Respiratory: CTAB/L    Cardiovascular: S1 and S2    Gastrointestinal: BS+, soft, NT/ND    Extremities: No peripheral edema    Neurological: A/O x 3, no focal deficits    Psychiatric: Normal mood, normal affect    : No Pina    Skin: No rashes    Comments:    ASA Class: I [ ]  II [ ]  III [X]  IV [ ]    The patient is a suitable candidate for the planned procedure unless box checked [ ]  No, explain:

## 2017-10-17 VITALS
HEART RATE: 72 BPM | TEMPERATURE: 99 F | SYSTOLIC BLOOD PRESSURE: 128 MMHG | DIASTOLIC BLOOD PRESSURE: 63 MMHG | OXYGEN SATURATION: 98 % | RESPIRATION RATE: 18 BRPM

## 2017-10-17 LAB
HCT VFR BLD CALC: 41.3 % — SIGNIFICANT CHANGE UP (ref 39–50)
HGB BLD-MCNC: 14.1 G/DL — SIGNIFICANT CHANGE UP (ref 13–17)
MCHC RBC-ENTMCNC: 31.2 PG — SIGNIFICANT CHANGE UP (ref 27–34)
MCHC RBC-ENTMCNC: 34.1 GM/DL — SIGNIFICANT CHANGE UP (ref 32–36)
MCV RBC AUTO: 91.4 FL — SIGNIFICANT CHANGE UP (ref 80–100)
PLATELET # BLD AUTO: 225 K/UL — SIGNIFICANT CHANGE UP (ref 150–400)
RBC # BLD: 4.52 M/UL — SIGNIFICANT CHANGE UP (ref 4.2–5.8)
RBC # FLD: 12.8 % — SIGNIFICANT CHANGE UP (ref 10.3–14.5)
SURGICAL PATHOLOGY STUDY: SIGNIFICANT CHANGE UP
WBC # BLD: 6.85 K/UL — SIGNIFICANT CHANGE UP (ref 3.8–10.5)
WBC # FLD AUTO: 6.85 K/UL — SIGNIFICANT CHANGE UP (ref 3.8–10.5)

## 2017-10-17 PROCEDURE — 85027 COMPLETE CBC AUTOMATED: CPT

## 2017-10-17 PROCEDURE — 88305 TISSUE EXAM BY PATHOLOGIST: CPT

## 2017-10-17 PROCEDURE — 88312 SPECIAL STAINS GROUP 1: CPT

## 2017-10-17 PROCEDURE — 96375 TX/PRO/DX INJ NEW DRUG ADDON: CPT

## 2017-10-17 PROCEDURE — 96374 THER/PROPH/DIAG INJ IV PUSH: CPT

## 2017-10-17 PROCEDURE — 82272 OCCULT BLD FECES 1-3 TESTS: CPT

## 2017-10-17 PROCEDURE — 85730 THROMBOPLASTIN TIME PARTIAL: CPT

## 2017-10-17 PROCEDURE — 74177 CT ABD & PELVIS W/CONTRAST: CPT

## 2017-10-17 PROCEDURE — 80053 COMPREHEN METABOLIC PANEL: CPT

## 2017-10-17 PROCEDURE — 99285 EMERGENCY DEPT VISIT HI MDM: CPT | Mod: 25

## 2017-10-17 PROCEDURE — 93005 ELECTROCARDIOGRAM TRACING: CPT

## 2017-10-17 PROCEDURE — 85610 PROTHROMBIN TIME: CPT

## 2017-10-17 PROCEDURE — 83690 ASSAY OF LIPASE: CPT

## 2017-10-17 PROCEDURE — 71045 X-RAY EXAM CHEST 1 VIEW: CPT

## 2017-10-17 PROCEDURE — 81003 URINALYSIS AUTO W/O SCOPE: CPT

## 2017-10-17 PROCEDURE — 80048 BASIC METABOLIC PNL TOTAL CA: CPT

## 2017-10-17 RX ORDER — PANTOPRAZOLE SODIUM 20 MG/1
1 TABLET, DELAYED RELEASE ORAL
Qty: 60 | Refills: 0 | OUTPATIENT
Start: 2017-10-17 | End: 2017-11-16

## 2017-10-17 RX ORDER — PANTOPRAZOLE SODIUM 20 MG/1
1 TABLET, DELAYED RELEASE ORAL
Qty: 0 | Refills: 0 | COMMUNITY
Start: 2017-10-17

## 2017-10-17 RX ADMIN — MODAFINIL 200 MILLIGRAM(S): 200 TABLET ORAL at 06:51

## 2017-10-17 RX ADMIN — PANTOPRAZOLE SODIUM 40 MILLIGRAM(S): 20 TABLET, DELAYED RELEASE ORAL at 06:30

## 2017-10-17 NOTE — PROGRESS NOTE ADULT - SUBJECTIVE AND OBJECTIVE BOX
COLORECTAL SURGERY PA      Subjective:  Pt stable overnight. No bloody bowel movements. Tolerating a reg diet. Colonoscopy and endoscopy failed to reveal any source of bleed. HCT stable.    Objective:    Gen- Nad  Abd- non tender, non distended  MEDICATIONS  (STANDING):  influenza   Vaccine 0.5 milliLiter(s) IntraMuscular once  modafinil 200 milliGRAM(s) Oral daily  pantoprazole    Tablet 40 milliGRAM(s) Oral two times a day before meals  polyethylene glycol/electrolyte Solution 1 Liter(s) Oral two times a day  sodium chloride 0.45%. 1000 milliLiter(s) (75 mL/Hr) IV Continuous <Continuous>    MEDICATIONS  (PRN):      Vital Signs Last 24 Hrs  T(C): 36.9 (17 Oct 2017 03:52), Max: 36.9 (16 Oct 2017 12:23)  T(F): 98.5 (17 Oct 2017 03:52), Max: 98.5 (17 Oct 2017 03:52)  HR: 69 (17 Oct 2017 03:52) (59 - 78)  BP: 135/85 (17 Oct 2017 03:52) (103/67 - 135/85)  BP(mean): --  RR: 18 (17 Oct 2017 03:52) (18 - 18)  SpO2: 97% (17 Oct 2017 03:52) (94% - 97%)    I&O's Detail    16 Oct 2017 07:01  -  17 Oct 2017 07:00  --------------------------------------------------------  IN:  Total IN: 0 mL    OUT:  Total OUT: 0 mL    Total NET: 0 mL          Daily     Daily     LABS:                        14.1   6.85  )-----------( 225      ( 17 Oct 2017 08:15 )             41.3

## 2017-10-17 NOTE — PROGRESS NOTE ADULT - SUBJECTIVE AND OBJECTIVE BOX
INTERVAL HPI/OVERNIGHT EVENTS: stable overnight, no significant pathology on EGD and colonoscopy    MEDICATIONS  (STANDING):  influenza   Vaccine 0.5 milliLiter(s) IntraMuscular once  modafinil 200 milliGRAM(s) Oral daily  pantoprazole    Tablet 40 milliGRAM(s) Oral two times a day before meals  polyethylene glycol/electrolyte Solution 1 Liter(s) Oral two times a day  sodium chloride 0.45%. 1000 milliLiter(s) (75 mL/Hr) IV Continuous <Continuous>    MEDICATIONS  (PRN):      Allergies    No Known Allergies    Intolerances            PHYSICAL EXAM:   Vital Signs:  Vital Signs Last 24 Hrs  T(C): 36.9 (17 Oct 2017 03:52), Max: 36.9 (16 Oct 2017 12:23)  T(F): 98.5 (17 Oct 2017 03:52), Max: 98.5 (17 Oct 2017 03:52)  HR: 69 (17 Oct 2017 03:52) (59 - 78)  BP: 135/85 (17 Oct 2017 03:52) (103/67 - 135/85)  BP(mean): --  RR: 18 (17 Oct 2017 03:52) (18 - 18)  SpO2: 97% (17 Oct 2017 03:52) (94% - 97%)  Daily     Daily     GENERAL:  no distress  HEENT:  NC/AT,  anicteric  CHEST:   no increased effort, breath sounds clear  HEART:  Regular rhythm  ABDOMEN:  Soft, non-tender, non-distended, normoactive bowel sounds,  no masses ,no hepato-splenomegaly, no signs of chronic liver disease  EXTEREMITIES:  no cyanosis      LABS:                        13.7   6.36  )-----------( 228      ( 16 Oct 2017 07:22 )             41.0     10-15    141  |  106  |  14  ----------------------------<  90  4.5   |  22  |  1.20    Ca    8.8      15 Oct 2017 09:30            RADIOLOGY & ADDITIONAL TESTS:

## 2017-10-17 NOTE — PROGRESS NOTE ADULT - ASSESSMENT
36 yo M with resolved GIB    - cont reg diet  - d/c home to follow up with us in office and GI for an outpatient capsule study    Gali SARGENT

## 2019-06-02 ENCOUNTER — EMERGENCY (EMERGENCY)
Facility: HOSPITAL | Age: 39
LOS: 1 days | Discharge: HOME | End: 2019-06-02
Attending: EMERGENCY MEDICINE
Payer: COMMERCIAL

## 2019-06-02 VITALS
DIASTOLIC BLOOD PRESSURE: 96 MMHG | HEART RATE: 88 BPM | SYSTOLIC BLOOD PRESSURE: 143 MMHG | TEMPERATURE: 98 F | RESPIRATION RATE: 20 BRPM | OXYGEN SATURATION: 98 %

## 2019-06-02 PROCEDURE — 93971 EXTREMITY STUDY: CPT

## 2019-06-02 PROCEDURE — 99284 EMERGENCY DEPT VISIT MOD MDM: CPT | Mod: 25

## 2019-06-02 PROCEDURE — 99284 EMERGENCY DEPT VISIT MOD MDM: CPT

## 2019-06-02 PROCEDURE — 93971 EXTREMITY STUDY: CPT | Mod: 26

## 2019-06-02 RX ORDER — CEPHALEXIN 500 MG
500 CAPSULE ORAL ONCE
Refills: 0 | Status: COMPLETED | OUTPATIENT
Start: 2019-06-02 | End: 2019-06-02

## 2019-06-02 RX ORDER — CEPHALEXIN 500 MG
1 CAPSULE ORAL
Qty: 28 | Refills: 0
Start: 2019-06-02 | End: 2019-06-08

## 2019-06-02 RX ADMIN — Medication 500 MILLIGRAM(S): at 21:41

## 2019-06-02 NOTE — ED ADULT TRIAGE NOTE - CADM TRG TX PRIOR TO ARRIVAL
none Complex Repair And Tissue Cultured Epidermal Autograft Text: The defect edges were debeveled with a #15 scalpel blade.  The primary defect was closed partially with a complex linear closure.  Given the location of the defect, shape of the defect and the proximity to free margins an tissue cultured epidermal autograft was deemed most appropriate to repair the remaining defect.  The graft was trimmed to fit the size of the remaining defect.  The graft was then placed in the primary defect, oriented appropriately, and sutured into place.

## 2019-06-02 NOTE — ED ADULT NURSE NOTE - NSIMPLEMENTINTERV_GEN_ALL_ED
Implemented All Universal Safety Interventions:  [place] to call system. Call bell, personal items and telephone within reach. Instruct patient to call for assistance. Room bathroom lighting operational. Non-slip footwear when patient is off stretcher. Physically safe environment: no spills, clutter or unnecessary equipment. Stretcher in lowest position, wheels locked, appropriate side rails in place.

## 2019-06-02 NOTE — ED PROVIDER NOTE - ATTENDING CONTRIBUTION TO CARE
Patient presenting with one week of RLE pain, swelling and redness.  Worsened today.  Feels similar to prior cellulitis episode many years ago.  Put teatree oil on it at home with some improvement in symptoms.  No fevers or chills at home.  No history of blood clots but spends most of his time sitting at work.    A 14 point review of systems is negative except as in HPI or otherwise documented.    Exam:  General: Patient well appearing, vital signs within normal limits  HEENT: airway patent with moist mucous membranes  Cardiac: RRR S1/S2 with strong peripheral pulses  Respiratory: lungs clear without respiratory distress  GI: abdomen soft, non tender, non distended  Neuro: no gross neurologic deficits  Skin: warm, well perfused, some RLE erythema, not particularly warm, slight diffuse tenderness  Psych: normal mood and affect    Possible DVT? also possible cellulitis (but not very impressive exam) - plan for DVT US and if negative likely empirically treat for cellulitis with PMD follow up.

## 2019-06-02 NOTE — ED PROVIDER NOTE - CLINICAL SUMMARY MEDICAL DECISION MAKING FREE TEXT BOX
39 Y M with hx of RLE cellulitis has no real appreciable changes on PE, will get DVT scan of RLE and then if negative likely DC on abx.

## 2019-06-02 NOTE — ED ADULT NURSE NOTE - OBJECTIVE STATEMENT
40 yo male presents to ED c/o R lower extremity redness x3 days, with swelling, 5/10 pain and tenderness to touch. Patient reports pain increased last night after activity and taking warm bath. Patient admits to "feeling achy" with subjective fevers. Patient states he took Motrin with no relief. Patient denies SOB, CP, nvd, recent illness/travel, falls/loc. Patient A&Ox4, breathing spontaneously, airway patent, bl clear lungs, abdomen nontender, +pulses, cap refill <2 seconds. Patient resting in bed, side rails up, plan of care explained.

## 2020-01-12 ENCOUNTER — EMERGENCY (EMERGENCY)
Facility: HOSPITAL | Age: 40
LOS: 1 days | Discharge: HOME | End: 2020-01-12
Attending: EMERGENCY MEDICINE
Payer: COMMERCIAL

## 2020-01-12 VITALS
TEMPERATURE: 98 F | SYSTOLIC BLOOD PRESSURE: 120 MMHG | DIASTOLIC BLOOD PRESSURE: 74 MMHG | HEIGHT: 69 IN | RESPIRATION RATE: 18 BRPM | HEART RATE: 62 BPM | OXYGEN SATURATION: 96 % | WEIGHT: 298.06 LBS

## 2020-01-12 LAB
ALBUMIN SERPL ELPH-MCNC: 4 G/DL — SIGNIFICANT CHANGE UP (ref 3.3–5)
ALP SERPL-CCNC: 59 U/L — SIGNIFICANT CHANGE UP (ref 40–120)
ALT FLD-CCNC: 42 U/L — SIGNIFICANT CHANGE UP (ref 10–45)
ANION GAP SERPL CALC-SCNC: 15 MMOL/L — SIGNIFICANT CHANGE UP (ref 5–17)
AST SERPL-CCNC: 25 U/L — SIGNIFICANT CHANGE UP (ref 10–40)
BASOPHILS # BLD AUTO: 0.08 K/UL — SIGNIFICANT CHANGE UP (ref 0–0.2)
BASOPHILS NFR BLD AUTO: 0.9 % — SIGNIFICANT CHANGE UP (ref 0–2)
BILIRUB SERPL-MCNC: 0.9 MG/DL — SIGNIFICANT CHANGE UP (ref 0.2–1.2)
BUN SERPL-MCNC: 16 MG/DL — SIGNIFICANT CHANGE UP (ref 7–23)
CALCIUM SERPL-MCNC: 9.1 MG/DL — SIGNIFICANT CHANGE UP (ref 8.4–10.5)
CHLORIDE SERPL-SCNC: 99 MMOL/L — SIGNIFICANT CHANGE UP (ref 96–108)
CO2 SERPL-SCNC: 21 MMOL/L — LOW (ref 22–31)
CREAT SERPL-MCNC: 1.23 MG/DL — SIGNIFICANT CHANGE UP (ref 0.5–1.3)
EOSINOPHIL # BLD AUTO: 0.24 K/UL — SIGNIFICANT CHANGE UP (ref 0–0.5)
EOSINOPHIL NFR BLD AUTO: 2.7 % — SIGNIFICANT CHANGE UP (ref 0–6)
GLUCOSE SERPL-MCNC: 80 MG/DL — SIGNIFICANT CHANGE UP (ref 70–99)
HCT VFR BLD CALC: 44.1 % — SIGNIFICANT CHANGE UP (ref 39–50)
HGB BLD-MCNC: 14.7 G/DL — SIGNIFICANT CHANGE UP (ref 13–17)
IMM GRANULOCYTES NFR BLD AUTO: 0.2 % — SIGNIFICANT CHANGE UP (ref 0–1.5)
LYMPHOCYTES # BLD AUTO: 1.87 K/UL — SIGNIFICANT CHANGE UP (ref 1–3.3)
LYMPHOCYTES # BLD AUTO: 21.3 % — SIGNIFICANT CHANGE UP (ref 13–44)
MCHC RBC-ENTMCNC: 31 PG — SIGNIFICANT CHANGE UP (ref 27–34)
MCHC RBC-ENTMCNC: 33.3 GM/DL — SIGNIFICANT CHANGE UP (ref 32–36)
MCV RBC AUTO: 93 FL — SIGNIFICANT CHANGE UP (ref 80–100)
MONOCYTES # BLD AUTO: 1.02 K/UL — HIGH (ref 0–0.9)
MONOCYTES NFR BLD AUTO: 11.6 % — SIGNIFICANT CHANGE UP (ref 2–14)
NEUTROPHILS # BLD AUTO: 5.57 K/UL — SIGNIFICANT CHANGE UP (ref 1.8–7.4)
NEUTROPHILS NFR BLD AUTO: 63.3 % — SIGNIFICANT CHANGE UP (ref 43–77)
NRBC # BLD: 0 /100 WBCS — SIGNIFICANT CHANGE UP (ref 0–0)
PLATELET # BLD AUTO: 230 K/UL — SIGNIFICANT CHANGE UP (ref 150–400)
POTASSIUM SERPL-MCNC: 4.2 MMOL/L — SIGNIFICANT CHANGE UP (ref 3.5–5.3)
POTASSIUM SERPL-SCNC: 4.2 MMOL/L — SIGNIFICANT CHANGE UP (ref 3.5–5.3)
PROT SERPL-MCNC: 7.5 G/DL — SIGNIFICANT CHANGE UP (ref 6–8.3)
RBC # BLD: 4.74 M/UL — SIGNIFICANT CHANGE UP (ref 4.2–5.8)
RBC # FLD: 11.8 % — SIGNIFICANT CHANGE UP (ref 10.3–14.5)
SODIUM SERPL-SCNC: 135 MMOL/L — SIGNIFICANT CHANGE UP (ref 135–145)
WBC # BLD: 8.8 K/UL — SIGNIFICANT CHANGE UP (ref 3.8–10.5)
WBC # FLD AUTO: 8.8 K/UL — SIGNIFICANT CHANGE UP (ref 3.8–10.5)

## 2020-01-12 PROCEDURE — 99284 EMERGENCY DEPT VISIT MOD MDM: CPT | Mod: 25

## 2020-01-12 PROCEDURE — 10061 I&D ABSCESS COMP/MULTIPLE: CPT

## 2020-01-12 PROCEDURE — 74177 CT ABD & PELVIS W/CONTRAST: CPT | Mod: 26

## 2020-01-12 PROCEDURE — 76536 US EXAM OF HEAD AND NECK: CPT | Mod: 26

## 2020-01-12 RX ORDER — ACETAMINOPHEN 500 MG
650 TABLET ORAL ONCE
Refills: 0 | Status: COMPLETED | OUTPATIENT
Start: 2020-01-12 | End: 2020-01-12

## 2020-01-12 RX ORDER — LIDOCAINE AND PRILOCAINE CREAM 25; 25 MG/G; MG/G
1 CREAM TOPICAL ONCE
Refills: 0 | Status: COMPLETED | OUTPATIENT
Start: 2020-01-12 | End: 2020-01-12

## 2020-01-12 RX ORDER — IBUPROFEN 200 MG
600 TABLET ORAL ONCE
Refills: 0 | Status: COMPLETED | OUTPATIENT
Start: 2020-01-12 | End: 2020-01-12

## 2020-01-12 RX ADMIN — Medication 650 MILLIGRAM(S): at 18:10

## 2020-01-12 RX ADMIN — Medication 600 MILLIGRAM(S): at 18:10

## 2020-01-12 NOTE — ED ADULT NURSE NOTE - OBJECTIVE STATEMENT
40 yo M C/o of abscess to mid back area onset 7 days. Provider at bedside. Denies Fever or chills. Medication given as ordered.

## 2020-01-12 NOTE — ED PROVIDER NOTE - CLINICAL SUMMARY MEDICAL DECISION MAKING FREE TEXT BOX
Joseph Frankel PGY1: 38 yo with back abscess. VSS. Patient looks well and is non toxic appearing. PE as above. Will IandD and then CT as POCUS shows deep abscess to ensure adequate drainage.  Will treat pain and reassess.

## 2020-01-12 NOTE — ED PROVIDER NOTE - PROGRESS NOTE DETAILS
No deep abscess on wet read on CT. Pt does not want to stay to CT results. Will return in 48 hours for wound check. Pt states pressure on back has improved. Patient informed of ED visit findings, understands plan.  Patient provided with written and further verbal instructions not included in discharge paperwork.  Patient instructed to follow up with their primary care physician in 2-3 days and return for new, worsened, or persistent symptoms.

## 2020-01-12 NOTE — ED ADULT NURSE NOTE - NSIMPLEMENTINTERV_GEN_ALL_ED
Implemented All Universal Safety Interventions:  [place] to call system. Call bell, personal items and telephone within reach. Instruct patient to call for assistance. Room bathroom lighting operational. Non-slip footwear when patient is off stretcher. Physically safe environment: no spills, clutter or unnecessary equipment. Stretcher in lowest position, wheels locked, appropriate side rails in place.

## 2020-01-12 NOTE — ED PROVIDER NOTE - NSFOLLOWUPINSTRUCTIONS_ED_ALL_ED_FT
Please follow up with your primary physician in 24-48 hr.  Seek immediate medical care for any new/worsening signs or symptoms.  Please take 600 mg of ibuprofen (aka Motrin, Advil) and/or 650-1000 mg acetaminophen (aka Tylenol) every 6 hours, as needed, for mild-moderate pain.  Do NOT exceed 3500mg acetaminophen in 24 hours.  Please do not take these medications if you do not have pain or if you have any history of bleeding disorders, kidney or liver disease.   Do not use ibuprofen if you are on blood thinners (anti-coagulation).  Take cephalexin 4 times per day for a week.    Return to the ED in 48 hours for wound check.

## 2020-01-12 NOTE — ED ADULT NURSE REASSESSMENT NOTE - NS ED NURSE REASSESS COMMENT FT1
Report received from HAILEY UNGER Pt resting comfortably with family at bedside. Awaiting US results. VSS. Safety maintained at all times, bed in lowest position, call bell in reach. Will continue to monitor closely.

## 2020-01-12 NOTE — ED PROVIDER NOTE - PATIENT PORTAL LINK FT
You can access the FollowMyHealth Patient Portal offered by [facility] by registering at the following website: [email or web address]. By joining [employer]’s FollowMyHealth portal, you will also be able to view your health information using other applications (apps) compatible with our system.

## 2020-01-12 NOTE — ED PROVIDER NOTE - ATTENDING CONTRIBUTION TO CARE
40yo male pmh abscess requiring surgical intevention p/w right mid back swelling x 7d. Denie systemic symptoms including fevers, chills, n/v/d, abd pain, neurological symptoms. Pt appears well. ~3cm swelling  with fluctuance and overlying blanching erythema on right paravertebral mid back. Concern for deeper abscess, will send to ultrasound for radiology ultrasound. Pain control. If no deeper abscess, will place wick and give keflex due to overlying cellulitis, deep nature of abscess. 38yo male pmh abscess requiring surgical intervention p/w right mid back swelling x 7d. Denies systemic symptoms including fevers, chills, n/v/d, abd pain, neurological symptoms. Pt appears well. ~3cm swelling  with fluctuance and mild overlying blanching erythema on right paravertebral mid back. Concern for deeper abscess, will send to ultrasound for radiology ultrasound. Pain control. If no deeper abscess, will place wick and give keflex due to deep nature of abscess.

## 2020-01-12 NOTE — ED ADULT NURSE NOTE - CHPI ED NUR SYMPTOMS NEG
no chills/no rectal pain/no pain/no vomiting/no blood in mucus/no purulent drainage/no redness/no bleeding at site/no fever

## 2020-01-12 NOTE — ED PROVIDER NOTE - PHYSICAL EXAMINATION
Gen: Alert and oriented. Lying comfortably in bed. Answering questions appropriately  HEENT: extra occular movements intact, no nasal discharge, mucous membranes moist  CV: Regular rate and rhythm, +S1/S2, no murmurs/rubs/gallops,   Resp: Clear to ausculation bilaterally, no wheezes/rhonchi/rales  GI: Abdomen soft non-distended, non tender to palpation, no masses  MSK: Back abscess on right. approx 3 cm with fluctuance and some modest surrounding erythema. No open wounds, no bruising, no LE edema, Homans sign negative bl  Neuro: A&Ox4, following commands, moving all four extremities spontaneously  Psych: appropriate mood

## 2020-01-13 VITALS
RESPIRATION RATE: 16 BRPM | OXYGEN SATURATION: 98 % | DIASTOLIC BLOOD PRESSURE: 81 MMHG | HEART RATE: 71 BPM | SYSTOLIC BLOOD PRESSURE: 119 MMHG

## 2020-01-13 PROCEDURE — 76536 US EXAM OF HEAD AND NECK: CPT

## 2020-01-13 PROCEDURE — 74177 CT ABD & PELVIS W/CONTRAST: CPT

## 2020-01-13 PROCEDURE — 85027 COMPLETE CBC AUTOMATED: CPT

## 2020-01-13 PROCEDURE — 99284 EMERGENCY DEPT VISIT MOD MDM: CPT | Mod: 25

## 2020-01-13 PROCEDURE — 80053 COMPREHEN METABOLIC PANEL: CPT

## 2020-01-13 PROCEDURE — 10061 I&D ABSCESS COMP/MULTIPLE: CPT

## 2020-01-13 RX ORDER — IBUPROFEN 200 MG
600 TABLET ORAL ONCE
Refills: 0 | Status: COMPLETED | OUTPATIENT
Start: 2020-01-13 | End: 2020-01-13

## 2020-01-13 RX ORDER — CEPHALEXIN 500 MG
500 CAPSULE ORAL ONCE
Refills: 0 | Status: COMPLETED | OUTPATIENT
Start: 2020-01-13 | End: 2020-01-13

## 2020-01-13 RX ORDER — CEPHALEXIN 500 MG
1 CAPSULE ORAL
Qty: 28 | Refills: 0
Start: 2020-01-13 | End: 2020-01-19

## 2020-01-13 RX ADMIN — Medication 600 MILLIGRAM(S): at 00:50

## 2020-01-13 RX ADMIN — Medication 500 MILLIGRAM(S): at 01:06

## 2020-01-14 ENCOUNTER — EMERGENCY (EMERGENCY)
Facility: HOSPITAL | Age: 40
LOS: 1 days | End: 2020-01-14
Attending: EMERGENCY MEDICINE
Payer: COMMERCIAL

## 2020-01-14 VITALS
SYSTOLIC BLOOD PRESSURE: 126 MMHG | HEART RATE: 80 BPM | HEIGHT: 69 IN | RESPIRATION RATE: 17 BRPM | OXYGEN SATURATION: 95 % | TEMPERATURE: 98 F | DIASTOLIC BLOOD PRESSURE: 78 MMHG

## 2020-01-14 PROCEDURE — G0463: CPT

## 2020-01-14 NOTE — ED PROVIDER NOTE - PHYSICAL EXAMINATION
PHYSICAL EXAMINATION:  VITALS REVIEWED.  VS normal  GENERALIZED APPEARANCE:  Comfortable, no acute distress, ambulating without difficulty  SKIN:  Warm, dry, no cyanosis; back abscess R mid back, packing removed, no active drainage, mild induration improving, non-tender  HEAD:  No obvious scalp lesions  EYES:  Conjunctiva pink, no icterus  ENMT:  Mucus membranes moist, no stridor  NECK:  Supple  ABDOMEN AND GI:  Soft, non-tender, non-distended.  No rebound, no guarding  EXTREMITIES:  No deformity, edema, or calf tenderness  NEURO: AAOx3, gross motor and sensory intact

## 2020-01-14 NOTE — ED PROVIDER NOTE - OBJECTIVE STATEMENT
39M w/ abscess s/p I&D, here for wound check. Wound appears well. Patient has not removed dressing from initial I&D. Denies any pain. States has been taking Cephalexin as prescribed. Denies fevers/chills. Denies any further drainage.

## 2020-01-14 NOTE — ED PROVIDER NOTE - CLINICAL SUMMARY MEDICAL DECISION MAKING FREE TEXT BOX
Back abscess, wound appears well, packing removed, wound re-dressed, will have patient follow up with surgery clinic

## 2020-01-14 NOTE — ED PROVIDER NOTE - NSFOLLOWUPCLINICS_GEN_ALL_ED_FT
[facility]  General Surgery  [address] [place] [address] - [address] [place] [address]  Phone: [phone]  Fax:   Follow Up Time: 4-6 Days

## 2021-08-14 ENCOUNTER — APPOINTMENT (OUTPATIENT)
Dept: DISASTER EMERGENCY | Facility: OTHER | Age: 41
End: 2021-08-14
Payer: COMMERCIAL

## 2021-08-14 PROCEDURE — 0011A: CPT

## 2021-09-11 ENCOUNTER — APPOINTMENT (OUTPATIENT)
Dept: DISASTER EMERGENCY | Facility: OTHER | Age: 41
End: 2021-09-11

## 2022-01-07 NOTE — ED ADULT NURSE NOTE - CAS TRG GENERAL NORM CIRC DET
[facility] Pulmonary and Critical Care    Outpatient Note    Subjective:   CHIEF COMPLAINT: No chief complaint on file. HPI:     The patient is 77 y.o. female who presents today for a new patient visit for evaluation of pulmonary nodule  There is no SOB. There is no cough. She is a smoker. Started smoking at a teen age. She smoke 1 or more PPD. She was able to quit couple times for several months. Appetite is good. Wt is stable. She had stroke 3 years ago and since she uses a cane. It affected her memory some. She has \"low energy over the past weveral months\". She has chronic back and leg pain. Past Medical History:    Past Medical History:   Diagnosis Date    Anxiety     Cancer [facility])     RIGHT URETER    Colon polyps     check q5yrs    CVA (cerebral vascular accident) ([facility] [address] 10/12/2018    Lacunar, left hemisphere    HTN (hypertension) 10/2011    Hyperlipidemia     Kidney stones     Neurologic gait dysfunction     Proteinuria 10/2011    Screening mammogram 2010    benign    Ureteral lesion, native, right        Social History:    Social History     Tobacco Use   Smoking Status Current Every Day Smoker    Packs/day: 1.00    Years: 50.00    Pack years: 50.00    Types: Cigarettes    Start date: 5/1/1958   Smokeless Tobacco Never Used       Family History:  Family History   Problem Relation Age of Onset    Diabetes Mother     Heart Disease Father     Cancer Brother     Kidney Disease Brother        Current Medications:  Current Outpatient Medications on File Prior to Visit   Medication Sig Dispense Refill    furosemide (LASIX) 40 MG tablet Take 0.5 tablets by mouth daily 30 tablet 3    oxyCODONE-acetaminophen (PERCOCET) 7.5-325 MG per tablet Take 1 tablet by mouth every 8 hours as needed for Pain (chronic back pain. ). Chronic back pain.  90 tablet 0    hydrOXYzine (ATARAX) 50 MG tablet Take 1 tablet by mouth 3 times daily as needed for Anxiety 90 tablet 3  mirtazapine (REMERON) 15 MG tablet Take 1 tablet by mouth nightly (Patient not taking: Reported on 1/7/2022) 30 tablet 3     No current facility-administered medications on file prior to visit. REVIEW OF SYSTEMS:    Review of Systems   Constitutional: Negative for chills, fatigue and fever. HENT: Negative for congestion. Respiratory: Negative for cough, chest tightness, shortness of breath and wheezing. Cardiovascular: Negative for chest pain, palpitations and leg swelling. Neurological: Negative for weakness. Psychiatric/Behavioral: The patient is not nervous/anxious. All other systems reviewed and are negative. Objective:   PHYSICAL EXAM:        VITALS:  BP (!) 147/67 (Site: Left Upper Arm, Position: Sitting, Cuff Size: Medium Adult)   Pulse 70   Temp 96.5 °F (35.8 °C) (Infrared)   Ht 5' 1\" (1.549 m)   Wt 126 lb (57.2 kg)   SpO2 100%   Breastfeeding No   BMI 23.81 kg/m²     Physical Exam  Vitals reviewed. Constitutional:       Appearance: She is well-developed. HENT:      Head: Normocephalic and atraumatic. Eyes:      Pupils: Pupils are equal, round, and reactive to light. Neck:      Vascular: No JVD. Cardiovascular:      Rate and Rhythm: Normal rate and regular rhythm. Heart sounds: No murmur heard. Pulmonary:      Effort: Pulmonary effort is normal. No respiratory distress. Breath sounds: Normal breath sounds. No stridor. No wheezing or rales. Abdominal:      General: Bowel sounds are normal.      Palpations: Abdomen is soft. Musculoskeletal:         General: No deformity. Cervical back: Neck supple. Skin:     General: Skin is warm and dry. Neurological:      Mental Status: She is alert and oriented to person, place, and time. Psychiatric:         Behavior: Behavior normal.       DATA:    CT cervical spine on 6/26/21  No evidence of acute cervical spine injury.       6 mm nodular opacity in the right upper lobe.      Assessment: Diagnosis Orders   1. Pulmonary nodule  CT CHEST WO CONTRAST   2. Other emphysema ([address]         Plan:   77year old female with long hx of heavy smoking is here for evaluation of 6mm pulmonary nodule found incidentally in the RUL while having CT neck. Images reviewed. Nodule is concerning given location, hx of heavy smoking, presence of emphysema on CT and her age. Since it has been six months I will get new full chest CT to have better evaluation of the lungs and to evaluate if there is any growth in this nodule. Counseled to quit smoking   Although she has emphysematous changes in the portion of the lung visible on neck CT she doesn't have symptoms at this time. Will hold on starting any inhalers at this time. Capillary refill less/equal to 2 seconds/Strong peripheral pulses

## 2022-06-23 NOTE — ED PROVIDER NOTE - CONSTITUTIONAL MENTATION
Discussed AREDS2 supplements, BP Control, UV protection and dark leafy green vegetables. awake/alert

## 2022-07-13 ENCOUNTER — APPOINTMENT (OUTPATIENT)
Age: 42
End: 2022-07-13

## 2022-07-13 ENCOUNTER — APPOINTMENT (OUTPATIENT)
Dept: PULMONOLOGY | Facility: CLINIC | Age: 42
End: 2022-07-13

## 2022-07-13 VITALS
WEIGHT: 315 LBS | OXYGEN SATURATION: 97 % | HEART RATE: 70 BPM | SYSTOLIC BLOOD PRESSURE: 142 MMHG | DIASTOLIC BLOOD PRESSURE: 95 MMHG | HEIGHT: 69 IN | BODY MASS INDEX: 46.65 KG/M2

## 2022-07-13 DIAGNOSIS — R06.02 SHORTNESS OF BREATH: ICD-10-CM

## 2022-07-13 DIAGNOSIS — G47.33 OBSTRUCTIVE SLEEP APNEA (ADULT) (PEDIATRIC): ICD-10-CM

## 2022-07-13 DIAGNOSIS — Z99.89 OBSTRUCTIVE SLEEP APNEA (ADULT) (PEDIATRIC): ICD-10-CM

## 2022-07-13 PROCEDURE — 99203 OFFICE O/P NEW LOW 30 MIN: CPT | Mod: 25

## 2022-07-13 PROCEDURE — 94729 DIFFUSING CAPACITY: CPT

## 2022-07-13 PROCEDURE — 94727 GAS DIL/WSHOT DETER LNG VOL: CPT

## 2022-07-13 PROCEDURE — 94010 BREATHING CAPACITY TEST: CPT

## 2022-07-13 PROCEDURE — ZZZZZ: CPT

## 2022-07-13 NOTE — PROCEDURE
[FreeTextEntry1] : Sleep study 2011 split-night AHI 69.3 events per hour  \par Titrated to CPAP 8\par \par CPAP data download.  Compliance excellent AHI 2.4 on treatment.  Current pressure range 11.6-14.7.\par \par PFT normal

## 2022-07-13 NOTE — HISTORY OF PRESENT ILLNESS
[Never] : never [TextBox_4] : Reassessment for sleep apnea patient last seen 2017\par Known severe LYNN.  Initially on CPAP 8 and then Device replaced 2017 with auto CPAP device.  Tolerating well has gained some weight.  Concerned about Respironics recall he is registered for replacement device.

## 2022-07-13 NOTE — PHYSICAL EXAM
[Normal Oropharynx] : normal oropharynx [Normal Appearance] : normal appearance [No Neck Mass] : no neck mass [Normal Rate/Rhythm] : normal rate/rhythm [Normal S1, S2] : normal s1, s2 [No Murmurs] : no murmurs [No Resp Distress] : no resp distress [Clear to Auscultation Bilaterally] : clear to auscultation bilaterally [No Abnormalities] : no abnormalities [Benign] : benign [Normal Gait] : normal gait [No Clubbing] : no clubbing [No Cyanosis] : no cyanosis [No Edema] : no edema [FROM] : FROM [Normal Color/ Pigmentation] : normal color/ pigmentation [No Focal Deficits] : no focal deficits [Oriented x3] : oriented x3 [Normal Affect] : normal affect [TextBox_2] : Obese male

## 2022-07-13 NOTE — ASSESSMENT
[FreeTextEntry1] : Patient is currently on treatment with PAP. Data download confirms excellent compliance. Patient continues to use treatment and is benefiting from it.\par Does wish to try replacement interface currently using standard [employer].  I gave him an Nuance Pro nasal pillow to try.\par \par He will be eligible for a replacement device in November 2022.  For now he should continue to use the recalled device pending the recall replacement or dispensing new device.  He asked about the cleaning devices and I told him they were actually part of the recall issue and should not be used.\par \par Also advised that patient should not use a Respironics provided replacement device until I have verified the appropriate settings.  He should bring in the device should he receive.  Otherwise follow-up in November when he is due to receive replacement I will need to check his compliance at that time prior to replacement

## 2022-10-07 NOTE — ED ADULT NURSE NOTE - NS ED NURSE DISCH DISPOSITION
"   LOS: 4 days    Patient Care Team:  Jatinder Haynes MD as PCP - General (Family Medicine)  Hyponatremia follow up     Subjective     Interval History:     No acute events overnight. No new complaints       Review of Systems:   No CP or SOA , fever, chills, rigors, rash, N/V, Constipation.       Objective     Vital Sign Min/Max for last 24 hours  Temp  Min: 97.5 °F (36.4 °C)  Max: 98.1 °F (36.7 °C)   BP  Min: 135/72  Max: 143/76   Pulse  Min: 64  Max: 98   Resp  Min: 16  Max: 18   SpO2  Min: 97 %  Max: 99 %   Flow (L/min)  Min: 2  Max: 3   No data recorded     Flowsheet Rows    Flowsheet Row First Filed Value   Admission Height 162.6 cm (64\") Documented at 10/03/2022 0604   Admission Weight 78 kg (172 lb) Documented at 10/03/2022 0604          I/O this shift:  In: 240 [P.O.:240]  Out: -   I/O last 3 completed shifts:  In: 1525 [P.O.:1525]  Out: -     Physical Exam:    Gen: Alert, NAD   HENT: NC, AT, EOMI   NECK: Supple, no JVD, Trachea midline   LUNGS: CTA bilaterally, non labored respirtation   CVS: S1/S2 audible, RRR, no murmur   Abd: Soft, NT, ND, BS+   Ext: No pedal edema, no cyanosis   CNS: Alert, NFD      WBC WBC   Date Value Ref Range Status   10/07/2022 21.80 (H) 3.40 - 10.80 10*3/mm3 Final   10/06/2022 19.95 (H) 3.40 - 10.80 10*3/mm3 Final   10/05/2022 9.40 3.40 - 10.80 10*3/mm3 Final      HGB Hemoglobin   Date Value Ref Range Status   10/07/2022 10.8 (L) 13.0 - 17.7 g/dL Final   10/06/2022 10.2 (L) 13.0 - 17.7 g/dL Final   10/05/2022 8.1 (L) 13.0 - 17.7 g/dL Final      HCT Hematocrit   Date Value Ref Range Status   10/07/2022 34.2 (L) 37.5 - 51.0 % Final   10/06/2022 31.6 (L) 37.5 - 51.0 % Final   10/05/2022 25.3 (L) 37.5 - 51.0 % Final      Platlets No results found for: LABPLAT   MCV MCV   Date Value Ref Range Status   10/07/2022 83.8 79.0 - 97.0 fL Final   10/06/2022 84.0 79.0 - 97.0 fL Final   10/05/2022 83.5 79.0 - 97.0 fL Final          Sodium Sodium   Date Value Ref Range Status   10/07/2022 130 " (L) 136 - 145 mmol/L Final   10/06/2022 128 (L) 136 - 145 mmol/L Final   10/05/2022 127 (L) 136 - 145 mmol/L Final   10/05/2022 129 (L) 136 - 145 mmol/L Final   10/04/2022 127 (L) 136 - 145 mmol/L Final      Potassium Potassium   Date Value Ref Range Status   10/07/2022 4.2 3.5 - 5.2 mmol/L Final   10/06/2022 4.1 3.5 - 5.2 mmol/L Final   10/05/2022 4.1 3.5 - 5.2 mmol/L Final   10/05/2022 3.5 3.5 - 5.2 mmol/L Final   10/05/2022 3.4 (L) 3.5 - 5.2 mmol/L Final   10/04/2022 3.3 (L) 3.5 - 5.2 mmol/L Final      Chloride Chloride   Date Value Ref Range Status   10/07/2022 94 (L) 98 - 107 mmol/L Final   10/06/2022 92 (L) 98 - 107 mmol/L Final   10/05/2022 92 (L) 98 - 107 mmol/L Final   10/05/2022 91 (L) 98 - 107 mmol/L Final   10/04/2022 89 (L) 98 - 107 mmol/L Final      CO2 CO2   Date Value Ref Range Status   10/07/2022 25.0 22.0 - 29.0 mmol/L Final   10/06/2022 23.0 22.0 - 29.0 mmol/L Final   10/05/2022 26.0 22.0 - 29.0 mmol/L Final   10/05/2022 27.0 22.0 - 29.0 mmol/L Final   10/04/2022 28.0 22.0 - 29.0 mmol/L Final      BUN BUN   Date Value Ref Range Status   10/07/2022 13 8 - 23 mg/dL Final   10/06/2022 12 8 - 23 mg/dL Final   10/05/2022 11 8 - 23 mg/dL Final   10/05/2022 9 8 - 23 mg/dL Final   10/04/2022 8 8 - 23 mg/dL Final      Creatinine Creatinine   Date Value Ref Range Status   10/07/2022 0.55 (L) 0.76 - 1.27 mg/dL Final   10/06/2022 0.43 (L) 0.76 - 1.27 mg/dL Final   10/05/2022 0.48 (L) 0.76 - 1.27 mg/dL Final   10/05/2022 0.46 (L) 0.76 - 1.27 mg/dL Final   10/04/2022 0.48 (L) 0.76 - 1.27 mg/dL Final      Calcium Calcium   Date Value Ref Range Status   10/07/2022 9.0 8.6 - 10.5 mg/dL Final   10/06/2022 8.4 (L) 8.6 - 10.5 mg/dL Final   10/05/2022 8.3 (L) 8.6 - 10.5 mg/dL Final   10/05/2022 8.1 (L) 8.6 - 10.5 mg/dL Final   10/04/2022 7.9 (L) 8.6 - 10.5 mg/dL Final      PO4 No results found for: CAPO4   Albumin Albumin   Date Value Ref Range Status   10/07/2022 2.70 (L) 3.50 - 5.20 g/dL Final   10/05/2022 2.50 (L)  3.50 - 5.20 g/dL Final      Magnesium No results found for: MG   Uric Acid No results found for: URICACID        Results Review:     I reviewed the patient's new clinical results.    allopurinol, 200 mg, Oral, TID  enoxaparin, 40 mg, Subcutaneous, Daily  fluticasone, 1 spray, Nasal, Daily  lisinopril, 10 mg, Oral, Daily  montelukast, 10 mg, Oral, Nightly  QUEtiapine, 25 mg, Oral, Nightly  rosuvastatin, 10 mg, Oral, Daily  senna-docusate sodium, 2 tablet, Oral, BID  sodium chloride, 10 mL, Intravenous, Q12H  sodium chloride, 10 mL, Intravenous, Q12H  sodium chloride, 2 g, Oral, TID With Meals  tamsulosin, 0.4 mg, Oral, Daily      lactated ringers, 9 mL/hr, Last Rate: 9 mL/hr (10/04/22 7124)        Medication Review:     Assessment & Plan       Lymphoma (HCC)    Rectal mass    Essential hypertension    Hydronephrosis    Febrile illness    Hypernatremia    Hypokalemia    1- Hyponatremia - Serum osmolality 263, Urine osmolality 115 and urine sodium less than 20. Suggest of poor oral solute intake vs polydipsia . Uric acid low. Improving.   2- Rectal mass   3-Hypokalemia - resolved.     Plan:  - Fluid restriction 1.2 lit/day   - Continue with salt tablets     Lise Chicas MD  10/07/22  10:18 EDT       Discharged

## 2022-12-07 NOTE — PATIENT PROFILE ADULT. - BILL OF RIGHTS/ADMISSION INFORMATION PROVIDED TO:
FROM: [address] 11-OVR 11 ([address])  TO: [address]ly [address] --  DX: --Intussusception  EMS Tx Reason: peds  Transfer Priority Level (1,2,3): 2  Referring: Jenifer Castro MD  Accepting: Dr Eliseo Quinn (ALS/BLS, etc): BLS  Reason for ALS/CCT if applicable (O2, tele, IVF, meds): No covid testing, NSL, Room air, no telem   P/U Time: SLETS 0230  Number for Report:  [phone]     Tawanda Joseph, RN  12/07/22 0107 Patient

## 2023-08-27 ENCOUNTER — EMERGENCY (EMERGENCY)
Facility: HOSPITAL | Age: 43
LOS: 1 days | Discharge: HOME | End: 2023-08-27
Attending: EMERGENCY MEDICINE
Payer: COMMERCIAL

## 2023-08-27 VITALS
OXYGEN SATURATION: 95 % | WEIGHT: 315 LBS | HEIGHT: 69 IN | SYSTOLIC BLOOD PRESSURE: 196 MMHG | HEART RATE: 87 BPM | DIASTOLIC BLOOD PRESSURE: 146 MMHG | RESPIRATION RATE: 18 BRPM | TEMPERATURE: 98 F

## 2023-08-27 DIAGNOSIS — Z98.890 OTHER SPECIFIED POSTPROCEDURAL STATES: Chronic | ICD-10-CM

## 2023-08-27 LAB
ALBUMIN SERPL ELPH-MCNC: 4.9 G/DL — SIGNIFICANT CHANGE UP (ref 3.3–5)
ALP SERPL-CCNC: 77 U/L — SIGNIFICANT CHANGE UP (ref 40–120)
ALT FLD-CCNC: 44 U/L — SIGNIFICANT CHANGE UP (ref 10–45)
ANION GAP SERPL CALC-SCNC: 13 MMOL/L — SIGNIFICANT CHANGE UP (ref 5–17)
APPEARANCE UR: CLEAR — SIGNIFICANT CHANGE UP
AST SERPL-CCNC: 27 U/L — SIGNIFICANT CHANGE UP (ref 10–40)
BACTERIA # UR AUTO: NEGATIVE — SIGNIFICANT CHANGE UP
BASE EXCESS BLDV CALC-SCNC: 3.7 MMOL/L — HIGH (ref -2–3)
BASOPHILS # BLD AUTO: 0.06 K/UL — SIGNIFICANT CHANGE UP (ref 0–0.2)
BASOPHILS NFR BLD AUTO: 0.6 % — SIGNIFICANT CHANGE UP (ref 0–2)
BILIRUB SERPL-MCNC: 1.3 MG/DL — HIGH (ref 0.2–1.2)
BILIRUB UR-MCNC: NEGATIVE — SIGNIFICANT CHANGE UP
BLD GP AB SCN SERPL QL: NEGATIVE — SIGNIFICANT CHANGE UP
BUN SERPL-MCNC: 18 MG/DL — SIGNIFICANT CHANGE UP (ref 7–23)
CA-I SERPL-SCNC: 1.28 MMOL/L — SIGNIFICANT CHANGE UP (ref 1.15–1.33)
CALCIUM SERPL-MCNC: 10.4 MG/DL — SIGNIFICANT CHANGE UP (ref 8.4–10.5)
CHLORIDE BLDV-SCNC: 97 MMOL/L — SIGNIFICANT CHANGE UP (ref 96–108)
CHLORIDE SERPL-SCNC: 98 MMOL/L — SIGNIFICANT CHANGE UP (ref 96–108)
CO2 BLDV-SCNC: 33 MMOL/L — HIGH (ref 22–26)
CO2 SERPL-SCNC: 26 MMOL/L — SIGNIFICANT CHANGE UP (ref 22–31)
COLOR SPEC: YELLOW — SIGNIFICANT CHANGE UP
CREAT SERPL-MCNC: 1.35 MG/DL — HIGH (ref 0.5–1.3)
DIFF PNL FLD: NEGATIVE — SIGNIFICANT CHANGE UP
EGFR: 66 ML/MIN/1.73M2 — SIGNIFICANT CHANGE UP
EOSINOPHIL # BLD AUTO: 0.04 K/UL — SIGNIFICANT CHANGE UP (ref 0–0.5)
EOSINOPHIL NFR BLD AUTO: 0.4 % — SIGNIFICANT CHANGE UP (ref 0–6)
EPI CELLS # UR: 3 /HPF — SIGNIFICANT CHANGE UP
GAS PNL BLDV: 134 MMOL/L — LOW (ref 136–145)
GAS PNL BLDV: SIGNIFICANT CHANGE UP
GAS PNL BLDV: SIGNIFICANT CHANGE UP
GLUCOSE BLDV-MCNC: 80 MG/DL — SIGNIFICANT CHANGE UP (ref 70–99)
GLUCOSE SERPL-MCNC: 75 MG/DL — SIGNIFICANT CHANGE UP (ref 70–99)
GLUCOSE UR QL: NEGATIVE — SIGNIFICANT CHANGE UP
HCO3 BLDV-SCNC: 31 MMOL/L — HIGH (ref 22–29)
HCT VFR BLD CALC: 52.6 % — HIGH (ref 39–50)
HCT VFR BLDA CALC: 55 % — HIGH (ref 39–51)
HGB BLD CALC-MCNC: 18.2 G/DL — HIGH (ref 12.6–17.4)
HGB BLD-MCNC: 17.5 G/DL — HIGH (ref 13–17)
HYALINE CASTS # UR AUTO: 2 /LPF — SIGNIFICANT CHANGE UP (ref 0–2)
IMM GRANULOCYTES NFR BLD AUTO: 1 % — HIGH (ref 0–0.9)
KETONES UR-MCNC: ABNORMAL
LACTATE BLDV-MCNC: 2.2 MMOL/L — HIGH (ref 0.5–2)
LACTATE SERPL-SCNC: 0.8 MMOL/L — SIGNIFICANT CHANGE UP (ref 0.5–2)
LEUKOCYTE ESTERASE UR-ACNC: NEGATIVE — SIGNIFICANT CHANGE UP
LIDOCAIN IGE QN: 28 U/L — SIGNIFICANT CHANGE UP (ref 7–60)
LYMPHOCYTES # BLD AUTO: 1.5 K/UL — SIGNIFICANT CHANGE UP (ref 1–3.3)
LYMPHOCYTES # BLD AUTO: 13.9 % — SIGNIFICANT CHANGE UP (ref 13–44)
MCHC RBC-ENTMCNC: 30.5 PG — SIGNIFICANT CHANGE UP (ref 27–34)
MCHC RBC-ENTMCNC: 33.3 GM/DL — SIGNIFICANT CHANGE UP (ref 32–36)
MCV RBC AUTO: 91.6 FL — SIGNIFICANT CHANGE UP (ref 80–100)
MONOCYTES # BLD AUTO: 1.09 K/UL — HIGH (ref 0–0.9)
MONOCYTES NFR BLD AUTO: 10.1 % — SIGNIFICANT CHANGE UP (ref 2–14)
NEUTROPHILS # BLD AUTO: 7.99 K/UL — HIGH (ref 1.8–7.4)
NEUTROPHILS NFR BLD AUTO: 74 % — SIGNIFICANT CHANGE UP (ref 43–77)
NITRITE UR-MCNC: NEGATIVE — SIGNIFICANT CHANGE UP
NRBC # BLD: 0 /100 WBCS — SIGNIFICANT CHANGE UP (ref 0–0)
PCO2 BLDV: 55 MMHG — SIGNIFICANT CHANGE UP (ref 42–55)
PH BLDV: 7.36 — SIGNIFICANT CHANGE UP (ref 7.32–7.43)
PH UR: 6 — SIGNIFICANT CHANGE UP (ref 5–8)
PLATELET # BLD AUTO: 303 K/UL — SIGNIFICANT CHANGE UP (ref 150–400)
PO2 BLDV: 39 MMHG — SIGNIFICANT CHANGE UP (ref 25–45)
POTASSIUM BLDV-SCNC: 3.9 MMOL/L — SIGNIFICANT CHANGE UP (ref 3.5–5.1)
POTASSIUM SERPL-MCNC: 3.9 MMOL/L — SIGNIFICANT CHANGE UP (ref 3.5–5.3)
POTASSIUM SERPL-SCNC: 3.9 MMOL/L — SIGNIFICANT CHANGE UP (ref 3.5–5.3)
PROT SERPL-MCNC: 8.6 G/DL — HIGH (ref 6–8.3)
PROT UR-MCNC: ABNORMAL
RBC # BLD: 5.74 M/UL — SIGNIFICANT CHANGE UP (ref 4.2–5.8)
RBC # FLD: 12.7 % — SIGNIFICANT CHANGE UP (ref 10.3–14.5)
RBC CASTS # UR COMP ASSIST: 2 /HPF — SIGNIFICANT CHANGE UP (ref 0–4)
RH IG SCN BLD-IMP: POSITIVE — SIGNIFICANT CHANGE UP
SAO2 % BLDV: 57.2 % — LOW (ref 67–88)
SODIUM SERPL-SCNC: 137 MMOL/L — SIGNIFICANT CHANGE UP (ref 135–145)
SP GR SPEC: 1.02 — SIGNIFICANT CHANGE UP (ref 1.01–1.02)
UROBILINOGEN FLD QL: NEGATIVE — SIGNIFICANT CHANGE UP
WBC # BLD: 10.79 K/UL — HIGH (ref 3.8–10.5)
WBC # FLD AUTO: 10.79 K/UL — HIGH (ref 3.8–10.5)
WBC UR QL: 2 /HPF — SIGNIFICANT CHANGE UP (ref 0–5)

## 2023-08-27 PROCEDURE — 84132 ASSAY OF SERUM POTASSIUM: CPT

## 2023-08-27 PROCEDURE — 99285 EMERGENCY DEPT VISIT HI MDM: CPT

## 2023-08-27 PROCEDURE — 81001 URINALYSIS AUTO W/SCOPE: CPT

## 2023-08-27 PROCEDURE — 86900 BLOOD TYPING SEROLOGIC ABO: CPT

## 2023-08-27 PROCEDURE — 96374 THER/PROPH/DIAG INJ IV PUSH: CPT | Mod: XU

## 2023-08-27 PROCEDURE — 83605 ASSAY OF LACTIC ACID: CPT

## 2023-08-27 PROCEDURE — 85018 HEMOGLOBIN: CPT

## 2023-08-27 PROCEDURE — 82803 BLOOD GASES ANY COMBINATION: CPT

## 2023-08-27 PROCEDURE — 80053 COMPREHEN METABOLIC PANEL: CPT

## 2023-08-27 PROCEDURE — 85014 HEMATOCRIT: CPT

## 2023-08-27 PROCEDURE — 74177 CT ABD & PELVIS W/CONTRAST: CPT | Mod: MA

## 2023-08-27 PROCEDURE — 99284 EMERGENCY DEPT VISIT MOD MDM: CPT | Mod: 25

## 2023-08-27 PROCEDURE — 84295 ASSAY OF SERUM SODIUM: CPT

## 2023-08-27 PROCEDURE — 86850 RBC ANTIBODY SCREEN: CPT

## 2023-08-27 PROCEDURE — 82435 ASSAY OF BLOOD CHLORIDE: CPT

## 2023-08-27 PROCEDURE — 86901 BLOOD TYPING SEROLOGIC RH(D): CPT

## 2023-08-27 PROCEDURE — 82947 ASSAY GLUCOSE BLOOD QUANT: CPT

## 2023-08-27 PROCEDURE — 82330 ASSAY OF CALCIUM: CPT

## 2023-08-27 PROCEDURE — 83690 ASSAY OF LIPASE: CPT

## 2023-08-27 PROCEDURE — 96375 TX/PRO/DX INJ NEW DRUG ADDON: CPT

## 2023-08-27 PROCEDURE — 87086 URINE CULTURE/COLONY COUNT: CPT

## 2023-08-27 PROCEDURE — 85025 COMPLETE CBC W/AUTO DIFF WBC: CPT

## 2023-08-27 PROCEDURE — 74177 CT ABD & PELVIS W/CONTRAST: CPT | Mod: 26,MA

## 2023-08-27 RX ORDER — FAMOTIDINE 10 MG/ML
1 INJECTION INTRAVENOUS
Qty: 15 | Refills: 0
Start: 2023-08-27 | End: 2023-09-10

## 2023-08-27 RX ORDER — FAMOTIDINE 10 MG/ML
20 INJECTION INTRAVENOUS ONCE
Refills: 0 | Status: COMPLETED | OUTPATIENT
Start: 2023-08-27 | End: 2023-08-27

## 2023-08-27 RX ORDER — ONDANSETRON 8 MG/1
4 TABLET, FILM COATED ORAL ONCE
Refills: 0 | Status: COMPLETED | OUTPATIENT
Start: 2023-08-27 | End: 2023-08-27

## 2023-08-27 RX ORDER — SODIUM CHLORIDE 9 MG/ML
1000 INJECTION INTRAMUSCULAR; INTRAVENOUS; SUBCUTANEOUS ONCE
Refills: 0 | Status: COMPLETED | OUTPATIENT
Start: 2023-08-27 | End: 2023-08-27

## 2023-08-27 RX ADMIN — ONDANSETRON 4 MILLIGRAM(S): 8 TABLET, FILM COATED ORAL at 21:12

## 2023-08-27 RX ADMIN — SODIUM CHLORIDE 1000 MILLILITER(S): 9 INJECTION INTRAMUSCULAR; INTRAVENOUS; SUBCUTANEOUS at 21:12

## 2023-08-27 RX ADMIN — Medication 30 MILLILITER(S): at 21:15

## 2023-08-27 RX ADMIN — FAMOTIDINE 20 MILLIGRAM(S): 10 INJECTION INTRAVENOUS at 21:12

## 2023-08-27 NOTE — ED PROVIDER NOTE - NSFOLLOWUPINSTRUCTIONS_ED_ALL_ED_FT
You were seen in the Emergency Department for abdominal pain. Lab and imaging results, if performed, were discussed with you along with your discharge diagnosis.    You will receive a call to make an appointment with Gastroenterology. List of providers and their information has been given. Follow up with your doctor in 1 week - bring copies of your results if you were given. If you do not have a primary doctor, please call [phone] to find one convenient for you.    A prescription for Pepcid 20mg once a day was sent to your pharmacy. Take as prescribed, Continue all prescribed medications.     To control your pain at home, you should take Tylenol 650mg-1000mg every 6 to 8 hours. Limit your maximum daily Tylenol from all sources to 4000mg. Be aware that many other medications contain acetaminophen which is also known as Tylenol. Taking Tylenol and Ibuprofen together has been shown to be more effective at relieving pain than taking them separately. These are both over the counter medications that you can  at your local pharmacy without a prescription. You need to respect all of the warnings on the bottles. You shouldn’t take these medications for more than a week without following up with your doctor. Both medications come with certain risks and side effects that you need to discuss with your doctor, especially if you are taking them for a prolonged period.    Return to ED for any new or worsening symptoms including but not limited to: development of chest pain, shortness of breath, fever, vomiting, focal numbness, weakness or tingling, any severe CP, headache, abdominal pain, back pain.      Rest and keep yourself hydrated with fluids.

## 2023-08-27 NOTE — ED PROVIDER NOTE - NSFOLLOWUPCLINICS_GEN_ALL_ED_FT
Gastroenterology at [facility]  Gastroenterology  [address] [place] [address]  Phone: [phone]  Fax:     [facility] - Primary Care  Primary Care  [address] [place], [place] [address] [place] [address]  Phone: [phone]  Fax:

## 2023-08-27 NOTE — ED ADULT NURSE NOTE - OBJECTIVE STATEMENT
43y M A&Ox4 C/o abdominal pain. Pt states that on Friday after eating Lisy, he had the sudden onset of vomiting. Pt states the vomiting lasted about 12 hours after which he noticed abdominal distention and the feeling of pressure in his abdomen. Pt also states that since the vomiting, he has not had a bowel movement but is able to pass gas and feels an increase in abdominal pressure causing him to have SOB. Upon assessment pt has abdominal distention, tenderness with palpation, tachypnea and is hypertensive. Pt also endorses mild nausea and SOB. Denies any Fever, cough, Diarrhea, Cp, Gu symptoms. PMH of diverticulitis, gastritis, sleep apnea and HTN. PSH of Uvuloplasty and nasoplasty.

## 2023-08-27 NOTE — ED PROVIDER NOTE - PHYSICAL EXAMINATION
General: Alert and Orientated x 3. No apparent distress.  Head: Normocephalic and atraumatic.  Eyes: PERRLA with EOMI.  Neck: Supple. Trachea midline.   Cardiac: Normal S1 and S2 w/ RRR. No murmurs appreciated. No JVD appreciated.  Pulmonary: CTA bilaterally. No increased WOB. No wheezes or crackles.  Abdominal: Soft,   Small soft umbilical hernia, no skin changes, diffuse  tenderness to palpation in the abdomen worse in the epigastrium.. (+) bowel sounds appreciated in all 4 quadrants. No hepatosplenomegaly.   Neurologic: No focal sensory or motor deficits.  Musculoskeletal: Strength appropriate in all 4 extremities for age with no limited ROM.  Skin: Color appropriate for race. Intact, warm, and well-perfused.  Psychiatric: Appropriate mood and affect. No apparent risk to self or others.

## 2023-08-27 NOTE — ED PROVIDER NOTE - CARE PLAN
Principal Discharge DX:	Abdominal pain   1 Principal Discharge DX:	Abdominal pain  Secondary Diagnosis:	Nausea and vomiting

## 2023-08-27 NOTE — ED PROVIDER NOTE - PROGRESS NOTE DETAILS
Bonita Sanders MD (PGY3): CT with hepatic steatosis, no acute findings.  Discussed with patient  to follow-up with gastroenterology and PMD.  We will give him referrals to these.  Return precaution discussed.  Discussed not to use Motrin.

## 2023-08-27 NOTE — ED PROVIDER NOTE - OBJECTIVE STATEMENT
Patient is a 43-year-old male with history of hypertension, prediabetes,  hyperlipidemia presenting to the ED with abdominal pain.  On Friday patient ate follow-up and started having diffuse abdominal pain associate with multiple episodes of nonbloody emesis.  He says he was vomiting, he had a subconjunctival hemorrhage.   His symptoms persisted but not as severe.  He is passing gas.  Has not had a bowel movement since Friday.  No history of abdominal surgeries.  Has a small reducible umbilical hernia.  No fever, chills, urinary changes.  No testicular pain or swelling.  No ingestions.

## 2023-08-27 NOTE — ED PROVIDER NOTE - CLINICAL SUMMARY MEDICAL DECISION MAKING FREE TEXT BOX
43-year-old male past medical history of hypertension hyperlipidemia prediabetes obstructive sleep apnea with CPAP gastritis diverticulitis recently noncompliant with blood pressure medication x weeks presenting to the emergency room with 3 days of upper abdominal discomfort associated with nonbloody vomitus and nausea subsequently developed diffuse abdominal discomfort associated with bloating has not had a bowel movement since onset of symptoms though reports he is passing flatus and belching.  Vital signs significant for hypertension.  Abdominal exam with decreased bowel sounds mild diffuse tenderness greatest in the right upper abdomen has an umbilical hernia which is soft and reducible heart and lung exam within normal limits nose CVA tenderness skin exam within normal limits differential diagnosis includes but is not limited to small bowel obstruction, gastritis, peptic ulcer disease, biliary pathology.  Check labs, CT abdomen pelvis, meds, reassess dispo pending imaging and labs.

## 2023-08-27 NOTE — ED PROVIDER NOTE - PATIENT PORTAL LINK FT
You can access the FollowMyHealth Patient Portal offered by [facility] by registering at the following website: [email or web address]. By joining [employer]’s FollowMyHealth portal, you will also be able to view your health information using other applications (apps) compatible with our system.

## 2023-08-28 VITALS
RESPIRATION RATE: 18 BRPM | OXYGEN SATURATION: 97 % | SYSTOLIC BLOOD PRESSURE: 157 MMHG | DIASTOLIC BLOOD PRESSURE: 94 MMHG | TEMPERATURE: 99 F | HEART RATE: 76 BPM

## 2023-08-29 LAB
CULTURE RESULTS: SIGNIFICANT CHANGE UP
SPECIMEN SOURCE: SIGNIFICANT CHANGE UP

## 2024-02-10 ENCOUNTER — EMERGENCY (EMERGENCY)
Facility: HOSPITAL | Age: 44
LOS: 1 days | Discharge: HOME | End: 2024-02-10
Attending: EMERGENCY MEDICINE
Payer: COMMERCIAL

## 2024-02-10 VITALS
HEIGHT: 69 IN | HEART RATE: 78 BPM | WEIGHT: 289.91 LBS | DIASTOLIC BLOOD PRESSURE: 109 MMHG | OXYGEN SATURATION: 96 % | TEMPERATURE: 98 F | SYSTOLIC BLOOD PRESSURE: 160 MMHG | RESPIRATION RATE: 20 BRPM

## 2024-02-10 VITALS
HEART RATE: 65 BPM | DIASTOLIC BLOOD PRESSURE: 110 MMHG | RESPIRATION RATE: 17 BRPM | OXYGEN SATURATION: 97 % | SYSTOLIC BLOOD PRESSURE: 155 MMHG | TEMPERATURE: 98 F

## 2024-02-10 DIAGNOSIS — Z98.890 OTHER SPECIFIED POSTPROCEDURAL STATES: Chronic | ICD-10-CM

## 2024-02-10 LAB
ALBUMIN SERPL ELPH-MCNC: 4.4 G/DL — SIGNIFICANT CHANGE UP (ref 3.3–5)
ALP SERPL-CCNC: 55 U/L — SIGNIFICANT CHANGE UP (ref 40–120)
ALT FLD-CCNC: 57 U/L — HIGH (ref 10–45)
ANION GAP SERPL CALC-SCNC: 9 MMOL/L — SIGNIFICANT CHANGE UP (ref 5–17)
APPEARANCE UR: CLEAR — SIGNIFICANT CHANGE UP
AST SERPL-CCNC: 30 U/L — SIGNIFICANT CHANGE UP (ref 10–40)
BASOPHILS # BLD AUTO: 0.06 K/UL — SIGNIFICANT CHANGE UP (ref 0–0.2)
BASOPHILS NFR BLD AUTO: 0.8 % — SIGNIFICANT CHANGE UP (ref 0–2)
BILIRUB SERPL-MCNC: 0.4 MG/DL — SIGNIFICANT CHANGE UP (ref 0.2–1.2)
BILIRUB UR-MCNC: NEGATIVE — SIGNIFICANT CHANGE UP
BUN SERPL-MCNC: 16 MG/DL — SIGNIFICANT CHANGE UP (ref 7–23)
CALCIUM SERPL-MCNC: 9.7 MG/DL — SIGNIFICANT CHANGE UP (ref 8.4–10.5)
CHLORIDE SERPL-SCNC: 102 MMOL/L — SIGNIFICANT CHANGE UP (ref 96–108)
CO2 SERPL-SCNC: 27 MMOL/L — SIGNIFICANT CHANGE UP (ref 22–31)
COLOR SPEC: YELLOW — SIGNIFICANT CHANGE UP
CREAT SERPL-MCNC: 1.36 MG/DL — HIGH (ref 0.5–1.3)
DIFF PNL FLD: NEGATIVE — SIGNIFICANT CHANGE UP
EGFR: 66 ML/MIN/1.73M2 — SIGNIFICANT CHANGE UP
EOSINOPHIL # BLD AUTO: 0.08 K/UL — SIGNIFICANT CHANGE UP (ref 0–0.5)
EOSINOPHIL NFR BLD AUTO: 1.1 % — SIGNIFICANT CHANGE UP (ref 0–6)
GLUCOSE SERPL-MCNC: 90 MG/DL — SIGNIFICANT CHANGE UP (ref 70–99)
GLUCOSE UR QL: NEGATIVE MG/DL — SIGNIFICANT CHANGE UP
HCT VFR BLD CALC: 47.1 % — SIGNIFICANT CHANGE UP (ref 39–50)
HGB BLD-MCNC: 15.4 G/DL — SIGNIFICANT CHANGE UP (ref 13–17)
IMM GRANULOCYTES NFR BLD AUTO: 0.4 % — SIGNIFICANT CHANGE UP (ref 0–0.9)
KETONES UR-MCNC: NEGATIVE MG/DL — SIGNIFICANT CHANGE UP
LEUKOCYTE ESTERASE UR-ACNC: NEGATIVE — SIGNIFICANT CHANGE UP
LYMPHOCYTES # BLD AUTO: 1.18 K/UL — SIGNIFICANT CHANGE UP (ref 1–3.3)
LYMPHOCYTES # BLD AUTO: 16.4 % — SIGNIFICANT CHANGE UP (ref 13–44)
MAGNESIUM SERPL-MCNC: 2.1 MG/DL — SIGNIFICANT CHANGE UP (ref 1.6–2.6)
MCHC RBC-ENTMCNC: 30.7 PG — SIGNIFICANT CHANGE UP (ref 27–34)
MCHC RBC-ENTMCNC: 32.7 GM/DL — SIGNIFICANT CHANGE UP (ref 32–36)
MCV RBC AUTO: 94 FL — SIGNIFICANT CHANGE UP (ref 80–100)
MONOCYTES # BLD AUTO: 0.79 K/UL — SIGNIFICANT CHANGE UP (ref 0–0.9)
MONOCYTES NFR BLD AUTO: 11 % — SIGNIFICANT CHANGE UP (ref 2–14)
NEUTROPHILS # BLD AUTO: 5.04 K/UL — SIGNIFICANT CHANGE UP (ref 1.8–7.4)
NEUTROPHILS NFR BLD AUTO: 70.3 % — SIGNIFICANT CHANGE UP (ref 43–77)
NITRITE UR-MCNC: NEGATIVE — SIGNIFICANT CHANGE UP
NRBC # BLD: 0 /100 WBCS — SIGNIFICANT CHANGE UP (ref 0–0)
PH UR: 6.5 — SIGNIFICANT CHANGE UP (ref 5–8)
PHOSPHATE SERPL-MCNC: 3.3 MG/DL — SIGNIFICANT CHANGE UP (ref 2.5–4.5)
PLATELET # BLD AUTO: 226 K/UL — SIGNIFICANT CHANGE UP (ref 150–400)
POTASSIUM SERPL-MCNC: 4.2 MMOL/L — SIGNIFICANT CHANGE UP (ref 3.5–5.3)
POTASSIUM SERPL-SCNC: 4.2 MMOL/L — SIGNIFICANT CHANGE UP (ref 3.5–5.3)
PROT SERPL-MCNC: 7.3 G/DL — SIGNIFICANT CHANGE UP (ref 6–8.3)
PROT UR-MCNC: NEGATIVE MG/DL — SIGNIFICANT CHANGE UP
RBC # BLD: 5.01 M/UL — SIGNIFICANT CHANGE UP (ref 4.2–5.8)
RBC # FLD: 12.7 % — SIGNIFICANT CHANGE UP (ref 10.3–14.5)
SODIUM SERPL-SCNC: 138 MMOL/L — SIGNIFICANT CHANGE UP (ref 135–145)
SP GR SPEC: 1 — SIGNIFICANT CHANGE UP (ref 1–1.03)
TROPONIN T, HIGH SENSITIVITY RESULT: 9 NG/L — SIGNIFICANT CHANGE UP (ref 0–51)
UROBILINOGEN FLD QL: 0.2 MG/DL — SIGNIFICANT CHANGE UP (ref 0.2–1)
WBC # BLD: 7.18 K/UL — SIGNIFICANT CHANGE UP (ref 3.8–10.5)
WBC # FLD AUTO: 7.18 K/UL — SIGNIFICANT CHANGE UP (ref 3.8–10.5)

## 2024-02-10 PROCEDURE — 93005 ELECTROCARDIOGRAM TRACING: CPT

## 2024-02-10 PROCEDURE — 82803 BLOOD GASES ANY COMBINATION: CPT

## 2024-02-10 PROCEDURE — 85014 HEMATOCRIT: CPT

## 2024-02-10 PROCEDURE — 82435 ASSAY OF BLOOD CHLORIDE: CPT

## 2024-02-10 PROCEDURE — 80053 COMPREHEN METABOLIC PANEL: CPT

## 2024-02-10 PROCEDURE — 83605 ASSAY OF LACTIC ACID: CPT

## 2024-02-10 PROCEDURE — 84132 ASSAY OF SERUM POTASSIUM: CPT

## 2024-02-10 PROCEDURE — 71046 X-RAY EXAM CHEST 2 VIEWS: CPT | Mod: 26

## 2024-02-10 PROCEDURE — 99285 EMERGENCY DEPT VISIT HI MDM: CPT

## 2024-02-10 PROCEDURE — 85025 COMPLETE CBC W/AUTO DIFF WBC: CPT

## 2024-02-10 PROCEDURE — 71046 X-RAY EXAM CHEST 2 VIEWS: CPT

## 2024-02-10 PROCEDURE — 87086 URINE CULTURE/COLONY COUNT: CPT

## 2024-02-10 PROCEDURE — 82947 ASSAY GLUCOSE BLOOD QUANT: CPT

## 2024-02-10 PROCEDURE — 99285 EMERGENCY DEPT VISIT HI MDM: CPT | Mod: 25

## 2024-02-10 PROCEDURE — 81003 URINALYSIS AUTO W/O SCOPE: CPT

## 2024-02-10 PROCEDURE — 85018 HEMOGLOBIN: CPT

## 2024-02-10 PROCEDURE — 83735 ASSAY OF MAGNESIUM: CPT

## 2024-02-10 PROCEDURE — 84295 ASSAY OF SERUM SODIUM: CPT

## 2024-02-10 PROCEDURE — 84484 ASSAY OF TROPONIN QUANT: CPT

## 2024-02-10 PROCEDURE — 82330 ASSAY OF CALCIUM: CPT

## 2024-02-10 PROCEDURE — 84100 ASSAY OF PHOSPHORUS: CPT

## 2024-02-10 NOTE — ED PROVIDER NOTE - PHYSICAL EXAMINATION
GENERAL: Awake, alert, NAD  HEENT: NC/AT, moist mucous membranes, EOMI  LUNGS: CTAB, no wheezes or crackles   CARDIAC: RRR, no m/r/g  ABDOMEN: Soft, non tender, non distended, no rebound, no guarding  BACK: no CVA tenderness  EXT: Bilateral lower extremity pitting edema, no calf tenderness, 2+ PT pulses bilaterally, no deformities.  NEURO: A&Ox3. Moving all extremities.  SKIN: Warm and dry. No rash.  PSYCH: Normal affect.

## 2024-02-10 NOTE — ED PROVIDER NOTE - PATIENT PORTAL LINK FT
You can access the FollowMyHealth Patient Portal offered by [facility] by registering at the following website: [email or web address]. By joining [email or web address]’s FollowMyHealth portal, you will also be able to view your health information using other applications (apps) compatible with our system.

## 2024-02-10 NOTE — ED PROVIDER NOTE - OBJECTIVE STATEMENT
43-year-old male patient past medical history of hypertension, hyperlipidemia who presents to the emergency department by referral of his urologist for elevated creatinine associated with uncontrolled blood pressure and bilateral flank pain.  Given health insurance problems patient has not been taking any high blood pressure medication for 7 months.  Patient currently only on Cialis for his BPH.  Patient was seen by his urologist and had routine blood work performed and was found with creatinine of 1.7.  Patient endorses his kidney function used to be 1.2 couple months ago.  Patient denies any chest pain, shortness of breath, nausea, vomiting, diarrhea, abdominal pain.

## 2024-02-10 NOTE — ED PROVIDER NOTE - NSFOLLOWUPINSTRUCTIONS_ED_ALL_ED_FT
You were evaluated in the ED. Please find your results attached to this document.     It is important to follow up with a primary care doctor or cardiology to control your blood pressure.     Continue all home medications.     Please return to the ED if you experience chest pain, shortness of breath or any other concerns.

## 2024-02-10 NOTE — ED ADULT NURSE NOTE - OBJECTIVE STATEMENT
43M aaox4 ambulatory came as walk in with h/o Vertigo, HTN, Gout, Migraine and Sleep apnea and Acute renal failure in the past, p/w c/o Elevated BP because he ran out of Bystolic 20mg for 2 weeks because of Insurance issues and also c/o back and flank pain and also reports he followed up with his urologist and had blood work done and found to have elevated Creatinine levels. Patient takes Cialis for BPH.  Patient denies any other symptoms.  Rt ac 18g IV access inserted, labs drawn pending results.

## 2024-02-10 NOTE — ED PROVIDER NOTE - CLINICAL SUMMARY MEDICAL DECISION MAKING FREE TEXT BOX
43-year-old male patient with history of high blood pressure, cholesterol, BPH on Cialis who was endorsed to visit the emergency department for elevated creatinine, bilateral flank pain, and uncontrolled blood pressure.  Patient has been noncompliant with any blood pressure medication given health insurance issues and inability to follow-up with her primary care doctor.  On exam, found with bilateral lower extremity pitting edema, however, pulses symmetric and no other focal findings such as calf pain.  Will draw labs including cardiac enzymes, will evaluate urine, EKG and chest x-ray.  Will most likely discharge.  Unlikely hypertensive emergency or urgency.  Systolic blood pressure found in the 170s and patient asymptomatic at this time. 43-year-old male patient with history of high blood pressure, cholesterol, BPH on Cialis who was endorsed to visit the emergency department for elevated creatinine, bilateral flank pain, and uncontrolled blood pressure.  Patient has been noncompliant with any blood pressure medication given health insurance issues and inability to follow-up with her primary care doctor.  On exam, found with bilateral lower extremity pitting edema, however, pulses symmetric and no other focal findings such as calf pain.  ECG nsr with stt changes 3/avf old from 2017    will obtain blood work ,ua and reassess ZR

## 2024-02-10 NOTE — ED ADULT NURSE NOTE - NSFALLUNIVINTERV_ED_ALL_ED
Bed/Stretcher in lowest position, wheels locked, appropriate side rails in place/Call bell, personal items and telephone in reach/Instruct patient to call for assistance before getting out of bed/chair/stretcher/Non-slip footwear applied when patient is off stretcher/[place] to call system/Physically safe environment - no spills, clutter or unnecessary equipment/Purposeful proactive rounding/Room/bathroom lighting operational, light cord in reach

## 2024-02-11 LAB
CULTURE RESULTS: NO GROWTH — SIGNIFICANT CHANGE UP
SPECIMEN SOURCE: SIGNIFICANT CHANGE UP